# Patient Record
Sex: MALE | Race: WHITE | NOT HISPANIC OR LATINO | Employment: FULL TIME | URBAN - METROPOLITAN AREA
[De-identification: names, ages, dates, MRNs, and addresses within clinical notes are randomized per-mention and may not be internally consistent; named-entity substitution may affect disease eponyms.]

---

## 2018-08-14 ENCOUNTER — OFFICE VISIT (OUTPATIENT)
Dept: URGENT CARE | Facility: CLINIC | Age: 43
End: 2018-08-14
Payer: COMMERCIAL

## 2018-08-14 VITALS
HEART RATE: 74 BPM | RESPIRATION RATE: 18 BRPM | OXYGEN SATURATION: 98 % | TEMPERATURE: 97.6 F | HEIGHT: 66 IN | WEIGHT: 286 LBS | BODY MASS INDEX: 45.96 KG/M2

## 2018-08-14 DIAGNOSIS — B35.1 ONYCHOMYCOSIS: ICD-10-CM

## 2018-08-14 DIAGNOSIS — L25.5 RHUS DERMATITIS: Primary | ICD-10-CM

## 2018-08-14 PROCEDURE — S9088 SERVICES PROVIDED IN URGENT: HCPCS | Performed by: PHYSICIAN ASSISTANT

## 2018-08-14 PROCEDURE — 99213 OFFICE O/P EST LOW 20 MIN: CPT | Performed by: PHYSICIAN ASSISTANT

## 2018-08-14 RX ORDER — PREDNISONE 10 MG/1
TABLET ORAL
Qty: 18 TABLET | Refills: 0 | Status: SHIPPED | OUTPATIENT
Start: 2018-08-14 | End: 2021-08-24 | Stop reason: ALTCHOICE

## 2018-08-14 NOTE — PROGRESS NOTES
[id number]        NAME: Sierra Sosa is a 40 y o  male  : 1975    MRN: [id number]  DATE: 2018  TIME: 3:10 PM    Assessment and Plan   Rhus dermatitis [L23 7]  1  Rhus dermatitis  predniSONE 10 mg tablet   2  Onychomycosis           Patient Instructions     otc Dr Letty Epstein or similar nail fungus treatment  OTC hydrocortisone cream for rash  Prednisone taper  Follow up with PCP in 3-5 days  Proceed to  ER if symptoms worsen  Chief Complaint     Chief Complaint   Patient presents with    Rash     x3 days    Foot Problem     x1 week         History of Present Illness         36-year-old male complains of left forearm itching rash for 1 week  He also reports he has had some discoloration of his left great toenail for several weeks  He tried cleaning out his toenail with alcohol and lifting up the nail bed  That did not help  He has been using calamine lotion on the rash  No Benadryl over-the-counter itch medicine  No trouble swallowing or breathing  Review of Systems   Review of Systems      Current Medications       Current Outpatient Prescriptions:     predniSONE 10 mg tablet, 3 tabs daily for 3 days, 2 tabs daily for 3 days, 1 tab daily for 3 days, Disp: 18 tablet, Rfl: 0    Current Allergies     Allergies as of 2018    (Not on File)            The following portions of the patient's history were reviewed and updated as appropriate: allergies, current medications, past family history, past medical history, past social history, past surgical history and problem list      History reviewed  No pertinent past medical history  History reviewed  No pertinent surgical history  No family history on file  Medications have been verified          Objective   Pulse 74   Temp 97 6 °F (36 4 °C) (Tympanic)   Resp 18   Ht 5' 6" (1 676 m)   Wt 130 kg (286 lb)   SpO2 98%   BMI 46 16 kg/m²        Physical Exam     Physical Exam   Constitutional: He appears well-developed and well-nourished  HENT:   Mouth/Throat: Oropharynx is clear and moist    Cardiovascular: Normal rate and regular rhythm  Pulmonary/Chest: Effort normal and breath sounds normal    Skin:     Left great toenail with lateral and medial nail fold yellowing and brittle nail  No paronychia  Left volar forearm with grouped linear red base vesicles and red papules  These are nontender no drainage

## 2018-08-14 NOTE — PATIENT INSTRUCTIONS
otc Dr Sil España or similar nail fungus treatment  OTC hydrocortisone cream for rash  Prednisone taper  Follow up with PCP in 3-5 days  Proceed to  ER if symptoms worsen

## 2021-03-23 ENCOUNTER — OFFICE VISIT (OUTPATIENT)
Dept: OBGYN CLINIC | Facility: CLINIC | Age: 46
End: 2021-03-23
Payer: COMMERCIAL

## 2021-03-23 ENCOUNTER — APPOINTMENT (OUTPATIENT)
Dept: RADIOLOGY | Facility: CLINIC | Age: 46
End: 2021-03-23
Payer: COMMERCIAL

## 2021-03-23 VITALS
HEIGHT: 66 IN | DIASTOLIC BLOOD PRESSURE: 86 MMHG | RESPIRATION RATE: 20 BRPM | BODY MASS INDEX: 47.51 KG/M2 | HEART RATE: 73 BPM | WEIGHT: 295.6 LBS | SYSTOLIC BLOOD PRESSURE: 129 MMHG

## 2021-03-23 DIAGNOSIS — M17.11 PRIMARY OSTEOARTHRITIS OF RIGHT KNEE: ICD-10-CM

## 2021-03-23 DIAGNOSIS — G89.29 CHRONIC PAIN OF RIGHT KNEE: Primary | ICD-10-CM

## 2021-03-23 DIAGNOSIS — M25.561 CHRONIC PAIN OF RIGHT KNEE: Primary | ICD-10-CM

## 2021-03-23 DIAGNOSIS — S83.241A TEAR OF MEDIAL MENISCUS OF RIGHT KNEE, CURRENT, UNSPECIFIED TEAR TYPE, INITIAL ENCOUNTER: ICD-10-CM

## 2021-03-23 DIAGNOSIS — M25.561 RIGHT KNEE PAIN, UNSPECIFIED CHRONICITY: ICD-10-CM

## 2021-03-23 PROCEDURE — 73564 X-RAY EXAM KNEE 4 OR MORE: CPT

## 2021-03-23 PROCEDURE — 99203 OFFICE O/P NEW LOW 30 MIN: CPT | Performed by: ORTHOPAEDIC SURGERY

## 2021-03-23 PROCEDURE — 73560 X-RAY EXAM OF KNEE 1 OR 2: CPT
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
## 2021-03-30 ENCOUNTER — OFFICE VISIT (OUTPATIENT)
Dept: OBGYN CLINIC | Facility: CLINIC | Age: 46
End: 2021-03-30
Payer: COMMERCIAL

## 2021-03-30 VITALS
SYSTOLIC BLOOD PRESSURE: 119 MMHG | RESPIRATION RATE: 20 BRPM | DIASTOLIC BLOOD PRESSURE: 75 MMHG | WEIGHT: 294 LBS | BODY MASS INDEX: 47.25 KG/M2 | HEART RATE: 65 BPM | HEIGHT: 66 IN

## 2021-03-30 DIAGNOSIS — S83.241A TEAR OF MEDIAL MENISCUS OF RIGHT KNEE, CURRENT, UNSPECIFIED TEAR TYPE, INITIAL ENCOUNTER: Primary | ICD-10-CM

## 2021-03-30 DIAGNOSIS — M17.11 PRIMARY OSTEOARTHRITIS OF RIGHT KNEE: ICD-10-CM

## 2021-03-30 PROCEDURE — 20610 DRAIN/INJ JOINT/BURSA W/O US: CPT | Performed by: ORTHOPAEDIC SURGERY

## 2021-03-30 PROCEDURE — 99213 OFFICE O/P EST LOW 20 MIN: CPT | Performed by: ORTHOPAEDIC SURGERY

## 2021-03-30 RX ORDER — BUPIVACAINE HYDROCHLORIDE 2.5 MG/ML
2 INJECTION, SOLUTION INFILTRATION; PERINEURAL
Status: COMPLETED | OUTPATIENT
Start: 2021-03-30 | End: 2021-03-30

## 2021-03-30 RX ORDER — LIDOCAINE HYDROCHLORIDE 10 MG/ML
2 INJECTION, SOLUTION INFILTRATION; PERINEURAL
Status: COMPLETED | OUTPATIENT
Start: 2021-03-30 | End: 2021-03-30

## 2021-03-30 RX ORDER — METHYLPREDNISOLONE ACETATE 40 MG/ML
2 INJECTION, SUSPENSION INTRA-ARTICULAR; INTRALESIONAL; INTRAMUSCULAR; SOFT TISSUE
Status: COMPLETED | OUTPATIENT
Start: 2021-03-30 | End: 2021-03-30

## 2021-03-30 RX ADMIN — LIDOCAINE HYDROCHLORIDE 2 ML: 10 INJECTION, SOLUTION INFILTRATION; PERINEURAL at 09:54

## 2021-03-30 RX ADMIN — BUPIVACAINE HYDROCHLORIDE 2 ML: 2.5 INJECTION, SOLUTION INFILTRATION; PERINEURAL at 09:54

## 2021-03-30 RX ADMIN — METHYLPREDNISOLONE ACETATE 2 ML: 40 INJECTION, SUSPENSION INTRA-ARTICULAR; INTRALESIONAL; INTRAMUSCULAR; SOFT TISSUE at 09:54

## 2021-03-30 NOTE — PROGRESS NOTES
Patient Name:  Aly Guerrero  MRN:  [de-identified]    Assessment & Plan     1  Tear of medial meniscus of right knee, current, unspecified tear type, initial encounter    2  Primary osteoarthritis of right knee        Patient has improving right knee pain with medial degenerative changes as well as medial meniscus tear and lateral meniscus tear on MRI without mechanical symptoms  Multiple treatment options were discussed with the patient today  Due to his symptoms per dominantly seeming to be related to degenerative changes and no current mechanical symptoms I have offered a corticosteroid injection  We discussed risks and benefits  The patient understood and elected to proceed forward  He tolerated the procedure well  We also discussed benefits of weight loss  I will see him back in 6 weeks for repeat evaluation of his symptoms at that time  If symptoms worsen or he develops mechanical symptoms we will consider possible viscosupplementation versus knee arthroscopy  History of the Present Illness   Aly Guerrero is a 41 y o  male For follow-up of right knee pain  He denies any swelling  He denies any locking or other mechanical symptoms  Pain is worse with activity  He does report improvement in his symptoms over the course of the past week  He does describe a dull intermittent ache in the medial aspect of his knee  No other new complaints  Review of Systems     Review of Systems    Physical Exam     /75   Pulse 65   Resp 20   Ht 5' 6" (1 676 m)   Wt 133 kg (294 lb)   BMI 47 45 kg/m²     Range of motion from 0 to 120  Degrees without pain  There is  no crepitus with range of motion  There is  no effusion  There is mild tenderness over the medial joint line  There is 5/5 quadriceps strength and tone  The patient  can perform a straight leg raise  Anterior and posterior drawer tests are  negative  There is  no varus or valgus instability  Kirk's testing is  negative    The patient is neurovascular intact distally  Data Review     I have personally reviewed pertinent films in PACS, and my interpretation follows  no new imaging reviewed today  Social History     Tobacco Use    Smoking status: Never Smoker    Smokeless tobacco: Never Used   Substance Use Topics    Alcohol use: No    Drug use: No           Large joint arthrocentesis  Universal Protocol:  Consent: Verbal consent obtained  Risks and benefits: risks, benefits and alternatives were discussed  Consent given by: patient  Time out: Immediately prior to procedure a "time out" was called to verify the correct patient, procedure, equipment, support staff and site/side marked as required  Patient understanding: patient states understanding of the procedure being performed  Patient consent: the patient's understanding of the procedure matches consent given  Radiology Images displayed and confirmed   If images not available, report reviewed: imaging studies available    Supporting Documentation  Indications: pain   Procedure Details  Needle size: 22 G  Ultrasound guidance: no  Approach: anterolateral  Medications administered: 2 mL bupivacaine 0 25 %; 2 mL lidocaine 1 %; 2 mL methylPREDNISolone acetate 40 mg/mL    Patient tolerance: patient tolerated the procedure well with no immediate complications          Shedrick Meigs, DO

## 2021-05-18 ENCOUNTER — OFFICE VISIT (OUTPATIENT)
Dept: OBGYN CLINIC | Facility: CLINIC | Age: 46
End: 2021-05-18
Payer: COMMERCIAL

## 2021-05-18 VITALS
BODY MASS INDEX: 47.25 KG/M2 | HEIGHT: 66 IN | HEART RATE: 73 BPM | RESPIRATION RATE: 20 BRPM | DIASTOLIC BLOOD PRESSURE: 83 MMHG | SYSTOLIC BLOOD PRESSURE: 125 MMHG | WEIGHT: 294 LBS

## 2021-05-18 DIAGNOSIS — M17.11 PRIMARY OSTEOARTHRITIS OF RIGHT KNEE: ICD-10-CM

## 2021-05-18 DIAGNOSIS — S86.111A GASTROCNEMIUS STRAIN, RIGHT, INITIAL ENCOUNTER: ICD-10-CM

## 2021-05-18 DIAGNOSIS — S76.311A HAMSTRING STRAIN, RIGHT, INITIAL ENCOUNTER: ICD-10-CM

## 2021-05-18 DIAGNOSIS — S83.241A TEAR OF MEDIAL MENISCUS OF RIGHT KNEE, CURRENT, UNSPECIFIED TEAR TYPE, INITIAL ENCOUNTER: Primary | ICD-10-CM

## 2021-05-18 DIAGNOSIS — S76.211A STRAIN OF ADDUCTOR MAGNUS MUSCLE, RIGHT, INITIAL ENCOUNTER: ICD-10-CM

## 2021-05-18 PROCEDURE — 99213 OFFICE O/P EST LOW 20 MIN: CPT | Performed by: ORTHOPAEDIC SURGERY

## 2021-05-18 NOTE — PROGRESS NOTES
Patient Name:  Pj Ennis  MRN:  [de-identified]    Assessment & Plan     1  Tear of medial meniscus of right knee, current, unspecified tear type, initial encounter  -     Ambulatory referral to Physical Therapy; Future    2  Primary osteoarthritis of right knee    3  Gastrocnemius strain, right, initial encounter  -     Ambulatory referral to Physical Therapy; Future    4  Hamstring strain, right, initial encounter  -     Ambulatory referral to Physical Therapy; Future    5  Strain of adductor donny muscle, right, initial encounter  -     Ambulatory referral to Physical Therapy; Future         Patient overall doing well status post right knee corticosteroid injection on 03/30/2021 and reports 70% improvement in his symptoms  He does have muscle soreness about his posterior medial thigh and medial calf  Likely secondary to compensatory changes in his gait secondary to pain  He reports improvements in his gait when he makes a conscious effort to walk more normally  We once again discussed conservative and surgical intervention  Due to his improvement I have recommended continued conservative management  At this time we have further discussed formal physical therapy to work on knee range of motion, gait training, core strengthening, and  Abductor, hamstring, and gastroc stretching  I will see him back in 6-8 weeks for repeat evaluation  History of the Present Illness   Pj Ennis is a 41 y o  male  Status post right knee corticosteroid injection on 03/30/2021  The patient reports about 70% improvement in his knee pain since the injection  He does report muscle soreness in his medial thigh and calf region which is worse with ambulation over the past several weeks  He feels that is due to compensation while ambulating  He does note making an effort to walk more normally without a limp and when he does so this pain improved  He denies any numbness or tingling  No back pain    No locking or other mechanical symptoms of the knee  No knee swelling  No other new complaints  Review of Systems     Review of Systems    Physical Exam     /83   Pulse 73   Resp 20   Ht 5' 6" (1 676 m)   Wt 133 kg (294 lb)   BMI 47 45 kg/m²       Right Knee: Range of motion from  0 to  120°  There is  Minimal patellofemoral crepitus with range of motion  There is  no effusion  There is minimal tenderness over the  Medial joint line  There is tenderness present over the abductor, medial hamstring, and medial gastroc muscle bellies without sign of trauma, erythema, ecchymosis  There is   4+/5 quadriceps strength and  normal tone  The patient  can perform a straight leg raise  negative  patellar grind test  Anterior drawer tests is  negative  Posterior drawer test is  negative  Varus stress testing reveals  No instability or pain  Valgus stress testing reveals  No instability or pain  Kirk's testing is  negative  The patient is neurovascular intact distally  Data Review     I have personally reviewed pertinent films in PACS, and my interpretation follows  No new imaging reviewed today      Social History     Tobacco Use    Smoking status: Never Smoker    Smokeless tobacco: Never Used   Substance Use Topics    Alcohol use: No    Drug use: No           Procedures    Tal Jessica, DO

## 2021-06-29 ENCOUNTER — OFFICE VISIT (OUTPATIENT)
Dept: OBGYN CLINIC | Facility: CLINIC | Age: 46
End: 2021-06-29
Payer: COMMERCIAL

## 2021-06-29 VITALS
HEART RATE: 66 BPM | SYSTOLIC BLOOD PRESSURE: 128 MMHG | DIASTOLIC BLOOD PRESSURE: 87 MMHG | BODY MASS INDEX: 47.09 KG/M2 | HEIGHT: 66 IN | WEIGHT: 293 LBS

## 2021-06-29 DIAGNOSIS — M22.41 PATELLA, CHONDROMALACIA, RIGHT: Primary | ICD-10-CM

## 2021-06-29 DIAGNOSIS — M25.561 CHRONIC PAIN OF RIGHT KNEE: ICD-10-CM

## 2021-06-29 DIAGNOSIS — G89.29 CHRONIC PAIN OF RIGHT KNEE: ICD-10-CM

## 2021-06-29 DIAGNOSIS — S76.311A HAMSTRING STRAIN, RIGHT, INITIAL ENCOUNTER: ICD-10-CM

## 2021-06-29 PROCEDURE — 99213 OFFICE O/P EST LOW 20 MIN: CPT | Performed by: ORTHOPAEDIC SURGERY

## 2021-06-29 NOTE — PROGRESS NOTES
Patient Name:  Padmini Huynh  MRN:  [de-identified]    Assessment & Plan     1  Chronic pain of right knee    2  Hamstring strain, right, initial encounter    3  Patella, chondromalacia, right        43-year-old male with right knee osteoarthritis, medial meniscus tear, hamstring, abductor and gastrocnemius strain  Currently he is more symptomatic from patellar chondromalacia  He does no previous improvement in his symptoms since corticosteroid injection but his improvement has plateaued  Once again discussed formal physical therapy  He displays quadriceps, hip abductor weakness as well as hamstring  And quadriceps tightness on exam   I also gave him a home exercise program focusing on short arc quadriceps strengthening exercises  I also discussed benefits of weight loss with the patient  Patient is now agreeable to begin physical therapy  Phys a new prescription was sent today  I will see him back in 6-8 weeks for repeat evaluation  History of the Present Illness   Padmini Huynh is a 41 y o  male with right knee osteoarthritis, medial meniscus tear, hamstring, abductor and gastrocnemius strain  At last appointment, patient reported he noted about 70% improvement of right knee pain after corticosteroid injection  Physical therapy was prescribed for passive and active knee range of motion, gait training, core strengthening, abductor, hamstring, and gastrocnemius stretching  Today,   Patient states overall pain is unchanged since last visit  Currently he has more pain in the anterior aspect of his knee which is worse with prolonged walking, bending, or stair climbing  He denies any locking  He has not begun physical therapy  He denies any numbness or tingling  No other new complaints        Review of Systems     Review of Systems   Constitutional: Negative for chills and fever  HENT: Negative for ear pain and sore throat  Eyes: Negative for pain and visual disturbance     Respiratory: Negative for cough and shortness of breath  Cardiovascular: Negative for chest pain and palpitations  Gastrointestinal: Negative for abdominal pain and vomiting  Genitourinary: Negative for dysuria and hematuria  Musculoskeletal: Negative for arthralgias and back pain  Skin: Negative for color change and rash  Neurological: Negative for seizures and syncope  All other systems reviewed and are negative  Physical Exam     /87   Pulse 66   Ht 5' 6" (1 676 m)   Wt 133 kg (293 lb)   BMI 47 29 kg/m²     Right Knee: Range of motion from  0 to  120° without pain  There is  Mild patellofemoral crepitus with range of motion  There is  no effusion  There is  No tenderness over the  Medial or lateral joint lines  There is 4+/5 quadriceps strength  With poor control of contraction  The patient  can perform a straight leg raise  positive  patellar grind test  Anterior drawer tests is  negative  negative Lachman Test   Varus stress testing reveals  No instability or pain  Valgus stress testing reveals  No instability or pain  iKrk's testing is  negative  The patient is neurovascular intact distally  Data Review     I have personally reviewed pertinent films in PACS, and my interpretation follows  No new images needed at today's appointment      Social History     Tobacco Use    Smoking status: Never Smoker    Smokeless tobacco: Never Used   Vaping Use    Vaping Use: Never used   Substance Use Topics    Alcohol use: No    Drug use: No           Procedures       Tal Boone, DO

## 2021-08-18 ENCOUNTER — EVALUATION (OUTPATIENT)
Dept: PHYSICAL THERAPY | Facility: CLINIC | Age: 46
End: 2021-08-18
Payer: COMMERCIAL

## 2021-08-18 DIAGNOSIS — G89.29 CHRONIC PAIN OF RIGHT KNEE: ICD-10-CM

## 2021-08-18 DIAGNOSIS — M25.561 CHRONIC PAIN OF RIGHT KNEE: ICD-10-CM

## 2021-08-18 DIAGNOSIS — M22.41 PATELLA, CHONDROMALACIA, RIGHT: ICD-10-CM

## 2021-08-18 PROCEDURE — 97161 PT EVAL LOW COMPLEX 20 MIN: CPT | Performed by: PHYSICAL THERAPIST

## 2021-08-18 PROCEDURE — 97112 NEUROMUSCULAR REEDUCATION: CPT | Performed by: PHYSICAL THERAPIST

## 2021-08-18 PROCEDURE — 97110 THERAPEUTIC EXERCISES: CPT | Performed by: PHYSICAL THERAPIST
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
## 2021-08-20 ENCOUNTER — OFFICE VISIT (OUTPATIENT)
Dept: PHYSICAL THERAPY | Facility: CLINIC | Age: 46
End: 2021-08-20
Payer: COMMERCIAL

## 2021-08-20 DIAGNOSIS — G89.29 CHRONIC PAIN OF RIGHT KNEE: Primary | ICD-10-CM

## 2021-08-20 DIAGNOSIS — M22.41 PATELLA, CHONDROMALACIA, RIGHT: ICD-10-CM

## 2021-08-20 DIAGNOSIS — M25.561 CHRONIC PAIN OF RIGHT KNEE: Primary | ICD-10-CM

## 2021-08-20 PROCEDURE — 97110 THERAPEUTIC EXERCISES: CPT

## 2021-08-20 NOTE — PROGRESS NOTES
Daily Note     Today's date: 2021  Patient name: Curtis Gibson  : 1975  MRN: [id number]  Referring provider: Nicole Tirado DO  Dx:   Encounter Diagnosis     ICD-10-CM    1  Chronic pain of right knee  M25 561     G89 29    2  Patella, chondromalacia, right  M22 41                   Subjective: Patient reports "the tape gives a pulling sensation "      Objective: See treatment diary below      Assessment: Tolerated treatment well  Performed all exercises with no increased pain  VC to decrease speed of activity for muscular control  Patient would benefit from continued PT      Plan: Continue per plan of care        Precautions: none      Manuals 8-18 (IE)            Medial PFJ glides NV            Kinesiotape to correct laterally tilted patella (75% tension middle third, no tension on tails) JG MARIAA                                     Neuro Re-Ed                                                                                                        Ther Ex             Upright bike 8 mins, L2 NV 10 min L2                        Standing:             - hip 3 way with TB 2 x 10 ea RTB NV 2x10 ea RTB 2x10           - gastroc stretch on rockerboard 4 x 30" NV                         Seated:             - HS stretch 4 x 30" NV            - Matrix LAQ (90-30 deg) 2 x 10 NV                         Table:             - prone quad stretch with strap 4 x 30" NV 4x 30"           - SLR flexion 2 x 10 NV 2x10           - bridges with TB around knee 20 x 5" RTB NV 20x 5"                                     Ther Activity             Front step ups 2 x 10 6" step NV 2x10 6"           Lateral step up and overs 2 x 10 6" step NV 2x10 6"           TG squats 2 x 10 L22 NV                                      Gait Training                                       Modalities             Cryo/MH as needed

## 2021-08-24 ENCOUNTER — OFFICE VISIT (OUTPATIENT)
Dept: PHYSICAL THERAPY | Facility: CLINIC | Age: 46
End: 2021-08-24
Payer: COMMERCIAL

## 2021-08-24 ENCOUNTER — OFFICE VISIT (OUTPATIENT)
Dept: OBGYN CLINIC | Facility: CLINIC | Age: 46
End: 2021-08-24
Payer: COMMERCIAL

## 2021-08-24 VITALS
BODY MASS INDEX: 47.09 KG/M2 | DIASTOLIC BLOOD PRESSURE: 78 MMHG | SYSTOLIC BLOOD PRESSURE: 119 MMHG | HEIGHT: 66 IN | HEART RATE: 70 BPM | WEIGHT: 293 LBS | RESPIRATION RATE: 18 BRPM

## 2021-08-24 DIAGNOSIS — M22.41 PATELLA, CHONDROMALACIA, RIGHT: ICD-10-CM

## 2021-08-24 DIAGNOSIS — S76.311A HAMSTRING STRAIN, RIGHT, INITIAL ENCOUNTER: ICD-10-CM

## 2021-08-24 DIAGNOSIS — G89.29 CHRONIC PAIN OF RIGHT KNEE: Primary | ICD-10-CM

## 2021-08-24 DIAGNOSIS — M17.11 PRIMARY OSTEOARTHRITIS OF RIGHT KNEE: Primary | ICD-10-CM

## 2021-08-24 DIAGNOSIS — M25.561 CHRONIC PAIN OF RIGHT KNEE: Primary | ICD-10-CM

## 2021-08-24 PROCEDURE — 97110 THERAPEUTIC EXERCISES: CPT | Performed by: PHYSICAL THERAPIST

## 2021-08-24 PROCEDURE — 99213 OFFICE O/P EST LOW 20 MIN: CPT | Performed by: ORTHOPAEDIC SURGERY

## 2021-08-24 PROCEDURE — 97112 NEUROMUSCULAR REEDUCATION: CPT | Performed by: PHYSICAL THERAPIST

## 2021-08-24 NOTE — PROGRESS NOTES
Daily Note     Today's date: 2021  Patient name: Agustin Covarrubias  : 1975  MRN: [id number]  Referring provider: Paras Ballard DO  Dx:   Encounter Diagnosis     ICD-10-CM    1  Chronic pain of right knee  M25 561     G89 29    2  Patella, chondromalacia, right  M22 41                   Subjective: Pt reports no increase in pain following previous session  Objective: See treatment diary below      Assessment: Progressed exercises this session as charted to enhance functional strength and endurance  Pt was able to tolerate all exercises with minimal complaints of pain/discomfort  Tolerated treatment well  Patient demonstrated fatigue post treatment, exhibited good technique with therapeutic exercises and would benefit from continued PT      Plan: Progress treatment as tolerated         Precautions: none      Manuals  (IE) -          Medial PFJ glides NV  JG grade IV          Kinesiotape to correct laterally tilted patella (75% tension middle third, no tension on tails) JG MARIAA JG                                    Neuro Re-Ed                                                                                                        Ther Ex             Upright bike 8 mins, L2 NV 10 min L2 10 mins, L4                       Standing:             - hip 3 way with TB 2 x 10 ea RTB NV 2x10 ea RTB 2x10 2 x 10 ea RTB          - gastroc stretch on rockerboard 4 x 30" NV  4 x 30"                       Seated:             - HS stretch 4 x 30" NV            - Matrix LAQ (90-30 deg) 2 x 10 NV  2 x 10 30#                       Table:             - prone quad stretch with strap 4 x 30" NV 4x 30"           - SLR flexion 2 x 10 NV 2x10           - bridges with TB around knee 20 x 5" RTB NV 20x 5"                                     Ther Activity             Front step ups 2 x 10 6" step NV 2x10 6" 2x10 6"          Lateral step up and overs 2 x 10 6" step NV 2x10 6" 2x10 6"          TG squats 2 x 10 L22 NV  2 x 10 L22                                    Gait Training                                       Modalities             Cryo/MH as needed

## 2021-08-24 NOTE — PROGRESS NOTES
Patient Name:  Dakota Watkins  MRN:  [de-identified]    Assessment & Plan     1  Primary osteoarthritis of right knee    2  Hamstring strain, right, initial encounter    3  Patella, chondromalacia, right        41year old male with Right knee osteoarthritis, hamstring strain  Overall, patient doing well since corticosteroid injection and initiation of physical therapy  Advised patient to continue attendance of PT with adherence to home exercises  Continue taping of Right patella for comfort, administration of OTC oral analgesics as needed for pain relief, and ice application  Patient verbalized understanding of the above and will follow up in approximately 8 weeks for reevaluation  History of the Present Illness   Dakota Watkins is a 41 y o  male with Right knee osteoarthritis, medial meniscus tear  At last appointment, physical therapy was ordered for patient to start strengthening of Right lower extremity  Today, patient reports approximately 80% improvement since corticosteroid injection was administered on 03/30/2021 and attendance of physical therapy  He reports therapist has been using kinesio tape on his Right patella which has been helping  Still locates a dull pain to medial aspect of Right knee  He does not administer OTC oral analgesics as needed for pain relief  Review of Systems     Review of Systems   Constitutional: Negative for chills and fever  HENT: Negative for ear pain and sore throat  Eyes: Negative for pain and visual disturbance  Respiratory: Negative for cough and shortness of breath  Cardiovascular: Negative for chest pain and palpitations  Gastrointestinal: Negative for abdominal pain and vomiting  Genitourinary: Negative for dysuria and hematuria  Musculoskeletal: Negative for arthralgias, back pain and gait problem  Skin: Negative for color change and rash  Neurological: Negative for seizures and syncope     All other systems reviewed and are negative  Physical Exam     /78   Pulse 70   Resp 18   Ht 5' 6" (1 676 m)   Wt 133 kg (293 lb)   BMI 47 29 kg/m²     Right Knee: Range of motion from 0 to 120  There is no crepitus with range of motion  There is no effusion  There is tenderness over the medial joint line, minimal tenderness noted over hamstring tendons or pes and  There is 5/5 quadriceps strength and improving tone  The patient can perform a straight leg raise  Equivocal  patellar grind test  Varus stress testing reveals no pain or laxity  Valgus stress testing reveals no pain or laxity  Kirk's testing is negative  The patient is neurovascular intact distally  Data Review     I have personally reviewed pertinent films in PACS, and my interpretation follows  No new images provided at today's appointment  X-rays performed at a previous appointment demonstrates minimal to mild medial joint space narrowing without significant degenerative change, fracture, or dislocation       Social History     Tobacco Use    Smoking status: Never Smoker    Smokeless tobacco: Never Used   Vaping Use    Vaping Use: Never used   Substance Use Topics    Alcohol use: No    Drug use: No           Procedures  None    Mery West PA-C

## 2021-08-25 ENCOUNTER — OFFICE VISIT (OUTPATIENT)
Dept: PHYSICAL THERAPY | Facility: CLINIC | Age: 46
End: 2021-08-25
Payer: COMMERCIAL

## 2021-08-25 DIAGNOSIS — M22.41 PATELLA, CHONDROMALACIA, RIGHT: Primary | ICD-10-CM

## 2021-08-25 DIAGNOSIS — G89.29 CHRONIC PAIN OF RIGHT KNEE: ICD-10-CM

## 2021-08-25 DIAGNOSIS — M25.561 CHRONIC PAIN OF RIGHT KNEE: ICD-10-CM

## 2021-08-25 PROCEDURE — 97110 THERAPEUTIC EXERCISES: CPT | Performed by: PHYSICAL THERAPIST

## 2021-08-25 PROCEDURE — 97530 THERAPEUTIC ACTIVITIES: CPT | Performed by: PHYSICAL THERAPIST

## 2021-08-25 NOTE — PROGRESS NOTES
Daily Note     Today's date: 2021  Patient name: Lane Prajapati  : 1975  MRN: [id number]  Referring provider: Junaid Webster DO  Dx:   Encounter Diagnosis     ICD-10-CM    1  Patella, chondromalacia, right  M22 41    2  Chronic pain of right knee  M25 561     G89 29                   Subjective: Pt reports he feels he is limping a little more due to general fatigue in his right leg  Objective: See treatment diary below      Assessment: Tolerated treatment well  Patient demonstrated fatigue post treatment, exhibited good technique with therapeutic exercises and would benefit from continued PT  Pt continues to require minimal verbal cues to perform exercises with correct form and technique  Plan: Progress treatment as tolerated         Precautions: none      Manuals  (IE)          Medial PFJ glides NV  JG grade IV          Kinesiotape to correct laterally tilted patella (75% tension middle third, no tension on tails) JG MARIAA JG Not needed                                   Neuro Re-Ed                                                                                                        Ther Ex             Upright bike 8 mins, L2 NV 10 min L2 10 mins, L4 10 mins, L4                      Standing:             - hip 3 way with TB 2 x 10 ea RTB NV 2x10 ea RTB 2x10 2 x 10 ea RTB 2 x 10 ea RTB         - gastroc stretch on rockerboard 4 x 30" NV  4 x 30" 4 x 30"                      Seated:             - HS stretch 4 x 30" NV            - Matrix LAQ (90-30 deg) 2 x 10 NV  2 x 10 30# 2 x 10 30#                      Table:             - prone quad stretch with strap 4 x 30" NV 4x 30"  4 x 30"         - SLR flexion 2 x 10 NV 2x10  2  x10         - bridges with TB around knee 20 x 5" RTB NV 20x 5"  20 x 5" RTB                                   Ther Activity             Front step ups 2 x 10 6" step NV 2x10 6" 2x10 6" 2x10 6"         Lateral step up and overs 2 x 10 6" step NV 2x10 6" 2x10 6" 2x10 6"         TG squats 2 x 10 L22 NV  2 x 10 L22 2 x 10 L22                                   Gait Training                                       Modalities             Cryo/MH as needed

## 2021-08-31 ENCOUNTER — OFFICE VISIT (OUTPATIENT)
Dept: PHYSICAL THERAPY | Facility: CLINIC | Age: 46
End: 2021-08-31
Payer: COMMERCIAL

## 2021-08-31 DIAGNOSIS — M25.561 CHRONIC PAIN OF RIGHT KNEE: ICD-10-CM

## 2021-08-31 DIAGNOSIS — G89.29 CHRONIC PAIN OF RIGHT KNEE: ICD-10-CM

## 2021-08-31 DIAGNOSIS — M22.41 PATELLA, CHONDROMALACIA, RIGHT: Primary | ICD-10-CM

## 2021-08-31 PROCEDURE — 97530 THERAPEUTIC ACTIVITIES: CPT

## 2021-08-31 PROCEDURE — 97140 MANUAL THERAPY 1/> REGIONS: CPT

## 2021-08-31 PROCEDURE — 97110 THERAPEUTIC EXERCISES: CPT

## 2021-09-03 ENCOUNTER — OFFICE VISIT (OUTPATIENT)
Dept: PHYSICAL THERAPY | Facility: CLINIC | Age: 46
End: 2021-09-03
Payer: COMMERCIAL

## 2021-09-03 DIAGNOSIS — M22.41 PATELLA, CHONDROMALACIA, RIGHT: Primary | ICD-10-CM

## 2021-09-03 DIAGNOSIS — M25.561 CHRONIC PAIN OF RIGHT KNEE: ICD-10-CM

## 2021-09-03 DIAGNOSIS — G89.29 CHRONIC PAIN OF RIGHT KNEE: ICD-10-CM

## 2021-09-03 PROCEDURE — 97110 THERAPEUTIC EXERCISES: CPT

## 2021-09-03 NOTE — PROGRESS NOTES
Daily Note     Today's date: 9/3/2021  Patient name: Lakeshia Ortez  : 1975  MRN: [id number]  Referring provider: Hemalatha Webster DO  Dx:   Encounter Diagnosis     ICD-10-CM    1  Patella, chondromalacia, right  M22 41    2  Chronic pain of right knee  M25 561     G89 29                   Subjective: Patient reports "I am a little sore but I think it's the weather "      Objective: See treatment diary below      Assessment: Tolerated treatment well  No increased pain with exercises  KT tape able to provide improved patellar tracking and decrease discomfort  Patient demonstrated fatigue post treatment, exhibited good technique with therapeutic exercises and would benefit from continued PT      Plan: Progress treatment as tolerated         Precautions: none      Manuals  (IE) 8/20 8-24 8-25 8/31 9/3       Medial PFJ glides NV  JG grade IV          Kinesiotape to correct laterally tilted patella (75% tension middle third, no tension on tails) JG MARIAA JG Not needed SC MARIAA                                 Neuro Re-Ed                                                                                                        Ther Ex             Upright bike 8 mins, L2 NV 10 min L2 10 mins, L4 10 mins, L4 10 min L4  10 min L4                    Standing:             - hip 3 way with TB 2 x 10 ea RTB NV 2x10 ea RTB 2x10 2 x 10 ea RTB 2 x 10 ea RTB 2x 10  2x10  ea RTB       - gastroc stretch on rockerboard 4 x 30" NV  4 x 30" 4 x 30" NV 4x30"                    Seated:             - HS stretch 4 x 30" NV            - Matrix LAQ (90-30 deg) 2 x 10 NV  2 x 10 30# 2 x 10 30# 2x 10 30#  2x10 30#                     Table:             - prone quad stretch with strap 4 x 30" NV 4x 30"  4 x 30" 4x 30"  4x 30"       - SLR flexion 2 x 10 NV 2x10  2  x10 2x 10  2x10        - bridges with TB around knee 20 x 5" RTB NV 20x 5"  20 x 5" RTB NV 20x 5"                                 Ther Activity             Front step ups 2 x 10 6" step NV 2x10 6" 2x10 6" 2x10 6" 2x 10 8"  2x10 8"       Lateral step up and overs 2 x 10 6" step NV 2x10 6" 2x10 6" 2x10 6" 2x 10 8"  2x10 8"       TG squats 2 x 10 L22 NV  2 x 10 L22 2 x 10 L22 2x 10 L22  2x10 L22                                 Gait Training                                       Modalities             Cryo/MH as needed

## 2021-09-07 ENCOUNTER — OFFICE VISIT (OUTPATIENT)
Dept: PHYSICAL THERAPY | Facility: CLINIC | Age: 46
End: 2021-09-07
Payer: COMMERCIAL

## 2021-09-07 DIAGNOSIS — M22.41 PATELLA, CHONDROMALACIA, RIGHT: ICD-10-CM

## 2021-09-07 DIAGNOSIS — G89.29 CHRONIC PAIN OF RIGHT KNEE: Primary | ICD-10-CM

## 2021-09-07 DIAGNOSIS — M25.561 CHRONIC PAIN OF RIGHT KNEE: Primary | ICD-10-CM

## 2021-09-07 PROCEDURE — 97112 NEUROMUSCULAR REEDUCATION: CPT | Performed by: PHYSICAL THERAPIST

## 2021-09-07 PROCEDURE — 97110 THERAPEUTIC EXERCISES: CPT | Performed by: PHYSICAL THERAPIST

## 2021-09-07 PROCEDURE — 97530 THERAPEUTIC ACTIVITIES: CPT | Performed by: PHYSICAL THERAPIST

## 2021-09-07 NOTE — PROGRESS NOTES
Daily Note     Today's date: 2021  Patient name: Agapito Snellen  : 1975  MRN: [id number]  Referring provider: Krishan Thurston DO  Dx:   Encounter Diagnosis     ICD-10-CM    1  Chronic pain of right knee  M25 561     G89 29    2  Patella, chondromalacia, right  M22 41                   Subjective: Pt reports his knee is no worse than when he started PT, but not significantly better  Pt does state the tape reduces pain  Objective: See treatment diary below      Assessment: Educated pt's wife this date on how to apply kinesiotape at home if they would like as well as provided information on a lateral j knee stabilization brace  Progressed exercises this session as charted  Pt was able to tolerate all exercises with minimal reports of pain/discomfort  Tolerated treatment well  Patient demonstrated fatigue post treatment, exhibited good technique with therapeutic exercises and would benefit from continued PT      Plan: Progress treatment as tolerated         Precautions: none      Manuals - (IE) 8/20 8-24 8-25 8/31 9/3 9-7      Medial PFJ glides NV  JG grade IV          Kinesiotape to correct laterally tilted patella (75% tension middle third, no tension on tails) Hanane Robertson JG Not needed SC MARIAA JG                                Neuro Re-Ed                                                                                                        Ther Ex             Upright bike 8 mins, L2 NV 10 min L2 10 mins, L4 10 mins, L4 10 min L4  10 min L4 10 mins, L4                   Standing:             - hip 3 way with TB 2 x 10 ea RTB NV 2x10 ea RTB 2x10 2 x 10 ea RTB 2 x 10 ea RTB 2x 10  2x10  ea RTB       - gastroc stretch on rockerboard 4 x 30" NV  4 x 30" 4 x 30" NV 4x30"       - lateral band walks       2 mins RTB      - retroband walks       2 mins RTB                                Seated:             - HS stretch 4 x 30" NV            - Matrix LAQ (90-30 deg) 2 x 10 NV  2 x 10 30# 2 x 10 30# 2x 10 30# 2x10 30#  NV                   Table:             - prone quad stretch with strap 4 x 30" NV 4x 30"  4 x 30" 4x 30"  4x 30" Provide in HEP      - SLR flexion 2 x 10 NV 2x10  2  x10 2x 10  2x10  Provide in HEP      - bridges with TB around knee 20 x 5" RTB NV 20x 5"  20 x 5" RTB NV 20x 5"                                 Ther Activity             Front step ups 2 x 10 6" step NV 2x10 6" 2x10 6" 2x10 6" 2x 10 8"  2x10 8" 3 x 10 8" step      Lateral step up and overs 2 x 10 6" step NV 2x10 6" 2x10 6" 2x10 6" 2x 10 8"  2x10 8" 3 x 10 8" step      TG squats 2 x 10 L22 NV  2 x 10 L22 2 x 10 L22 2x 10 L22  2x10 L22 3 x 10 L22      TKE at gigi resisting valgus force       20 x 5" NV                   Gait Training                                       Modalities             Cryo/MH as needed

## 2021-09-09 ENCOUNTER — OFFICE VISIT (OUTPATIENT)
Dept: PHYSICAL THERAPY | Facility: CLINIC | Age: 46
End: 2021-09-09
Payer: COMMERCIAL

## 2021-09-09 DIAGNOSIS — M22.41 PATELLA, CHONDROMALACIA, RIGHT: ICD-10-CM

## 2021-09-09 DIAGNOSIS — G89.29 CHRONIC PAIN OF RIGHT KNEE: Primary | ICD-10-CM

## 2021-09-09 DIAGNOSIS — M25.561 CHRONIC PAIN OF RIGHT KNEE: Primary | ICD-10-CM

## 2021-09-09 PROCEDURE — 97110 THERAPEUTIC EXERCISES: CPT

## 2021-09-09 PROCEDURE — 97530 THERAPEUTIC ACTIVITIES: CPT

## 2021-09-09 NOTE — PROGRESS NOTES
Daily Note     Today's date: 2021  Patient name: Inda Sicard  : 1975  MRN: [id number]  Referring provider: Mackenzie Castillo DO  Dx:   Encounter Diagnosis     ICD-10-CM    1  Chronic pain of right knee  M25 561     G89 29    2  Patella, chondromalacia, right  M22 41                   Subjective: Pt reports his knee is feeling good overall and he feels the tape is helpful  He presents to therapy with KT tape still present on knee from lv  Objective: See treatment diary below      Assessment: Pt with good tolerance to addition of TKEs today  Fair ability to perform without valgus knee  Pt demonstrates glute fatigue with lateral band walking and retro walking  Tolerated remainder of treatment well with no complaints of increased pain or soreness  Patient demonstrated fatigue post treatment, exhibited good technique with therapeutic exercises and would benefit from continued PT      Plan: Progress treatment as tolerated         Precautions: none      Manuals 8-18 (IE) 8/20 8-24 8-25 8/31 9/3 9-     Medial PFJ glides NV  JG grade IV          Kinesiotape to correct laterally tilted patella (75% tension middle third, no tension on tails) JG MARIAA JG Not needed SC MARIAA JG SC                               Neuro Re-Ed                                                                                                        Ther Ex             Upright bike 8 mins, L2 NV 10 min L2 10 mins, L4 10 mins, L4 10 min L4  10 min L4 10 mins, L4 10 min  L4                  Standing:             - hip 3 way with TB 2 x 10 ea RTB NV 2x10 ea RTB 2x10 2 x 10 ea RTB 2 x 10 ea RTB 2x 10  2x10  ea RTB       - gastroc stretch on rockerboard 4 x 30" NV  4 x 30" 4 x 30" NV 4x30"       - lateral band walks       2 mins RTB 2 mins  RTB     - retroband walks       2 mins RTB 2 mins  RTB                               Seated:             - HS stretch 4 x 30" NV            - Matrix LAQ (90-30 deg) 2 x 10 NV  2 x 10 30# 2 x 10 30# 2x 10 30# 2x10 30#  NV 2x10   30#                  Table:             - prone quad stretch with strap 4 x 30" NV 4x 30"  4 x 30" 4x 30"  4x 30" Provide in HEP HEP     - SLR flexion 2 x 10 NV 2x10  2  x10 2x 10  2x10  Provide in HEP HEP     - bridges with TB around knee 20 x 5" RTB NV 20x 5"  20 x 5" RTB NV 20x 5"                                 Ther Activity             Front step ups 2 x 10 6" step NV 2x10 6" 2x10 6" 2x10 6" 2x 10 8"  2x10 8" 3 x 10 8" step 3x10  8" step     Lateral step up and overs 2 x 10 6" step NV 2x10 6" 2x10 6" 2x10 6" 2x 10 8"  2x10 8" 3 x 10 8" step 3x10 8" step     TG squats 2 x 10 L22 NV  2 x 10 L22 2 x 10 L22 2x 10 L22  2x10 L22 3 x 10 L22 3x10  L22     TKE at gigi resisting valgus force       20 x 5" NV 5#  20x5"                  Gait Training                                       Modalities             Cryo/MH as needed

## 2021-09-13 ENCOUNTER — OFFICE VISIT (OUTPATIENT)
Dept: PHYSICAL THERAPY | Facility: CLINIC | Age: 46
End: 2021-09-13
Payer: COMMERCIAL

## 2021-09-13 DIAGNOSIS — M22.41 PATELLA, CHONDROMALACIA, RIGHT: ICD-10-CM

## 2021-09-13 DIAGNOSIS — M25.561 CHRONIC PAIN OF RIGHT KNEE: Primary | ICD-10-CM

## 2021-09-13 DIAGNOSIS — G89.29 CHRONIC PAIN OF RIGHT KNEE: Primary | ICD-10-CM

## 2021-09-13 PROCEDURE — 97140 MANUAL THERAPY 1/> REGIONS: CPT

## 2021-09-13 PROCEDURE — 97110 THERAPEUTIC EXERCISES: CPT

## 2021-09-13 PROCEDURE — 97530 THERAPEUTIC ACTIVITIES: CPT

## 2021-09-13 NOTE — PROGRESS NOTES
Daily Note     Today's date: 2021  Patient name: James Guy  : 1975  MRN: [id number]  Referring provider: Yasemin Diaz DO  Dx:   Encounter Diagnosis     ICD-10-CM    1  Chronic pain of right knee  M25 561     G89 29    2  Patella, chondromalacia, right  M22 41                   Subjective: Pt noted that he feels like the pain is just the same as he started PT  Pt noted the tape application last visit really didn't feel like it helped  Pt noted that the pain not consistent  Objective: See treatment diary below      Assessment:  Continued with treatment session, trialed application of tape again this visit  VC/TC required for proper knee alignment with TKE to avoid compensation mechanics  Educated on importance of strengthening and continuation of Hep at home  Tolerated treatment fair  Patient exhibited good technique with therapeutic exercises and would benefit from continued PT  S/p treatment session patient noted no changed in pain status  Plan: Continue per plan of care        Precautions: none      Manuals  (IE) 8/20 8-24 8-25 8/31 9/3 9-    Medial PFJ glides NV  JG grade IV          Kinesiotape to correct laterally tilted patella (75% tension middle third, no tension on tails) JG MARIAA JG Not needed SC MARIAA JG SC SC                              Neuro Re-Ed                                                                                                        Ther Ex             Upright bike 8 mins, L2 NV 10 min L2 10 mins, L4 10 mins, L4 10 min L4  10 min L4 10 mins, L4 10 min  L4 10 min L4                  Standing:             - hip 3 way with TB 2 x 10 ea RTB NV 2x10 ea RTB 2x10 2 x 10 ea RTB 2 x 10 ea RTB 2x 10  2x10  ea RTB       - gastroc stretch on rockerboard 4 x 30" NV  4 x 30" 4 x 30" NV 4x30"       - lateral band walks       2 mins RTB 2 mins  RTB NV    - retroband walks       2 mins RTB 2 mins  RTB NV                              Seated:             - HS stretch 4 x 30" NV            - Matrix LAQ (90-30 deg) 2 x 10 NV  2 x 10 30# 2 x 10 30# 2x 10 30#  2x10 30#  NV 2x10   30# NV                 Table:             - prone quad stretch with strap 4 x 30" NV 4x 30"  4 x 30" 4x 30"  4x 30" Provide in HEP HEP     - SLR flexion 2 x 10 NV 2x10  2  x10 2x 10  2x10  Provide in HEP HEP 2x 10 VC     - bridges with TB around knee 20 x 5" RTB NV 20x 5"  20 x 5" RTB NV 20x 5"                                 Ther Activity             Front step ups 2 x 10 6" step NV 2x10 6" 2x10 6" 2x10 6" 2x 10 8"  2x10 8" 3 x 10 8" step 3x10  8" step 3x 10 8" step     Lateral step up and overs 2 x 10 6" step NV 2x10 6" 2x10 6" 2x10 6" 2x 10 8"  2x10 8" 3 x 10 8" step 3x10 8" step 3x 10 8" step     TG squats 2 x 10 L22 NV  2 x 10 L22 2 x 10 L22 2x 10 L22  2x10 L22 3 x 10 L22 3x10  L22 L22 3x 10     TKE at gigi resisting valgus force       20 x 5" NV 5#  20x5" 5# 20x 5"                  Gait Training                                       Modalities             Cryo/MH as needed

## 2021-09-16 ENCOUNTER — EVALUATION (OUTPATIENT)
Dept: PHYSICAL THERAPY | Facility: CLINIC | Age: 46
End: 2021-09-16
Payer: COMMERCIAL

## 2021-09-16 DIAGNOSIS — M25.561 CHRONIC PAIN OF RIGHT KNEE: Primary | ICD-10-CM

## 2021-09-16 DIAGNOSIS — M22.41 PATELLA, CHONDROMALACIA, RIGHT: ICD-10-CM

## 2021-09-16 DIAGNOSIS — G89.29 CHRONIC PAIN OF RIGHT KNEE: Primary | ICD-10-CM

## 2021-09-16 PROCEDURE — 97530 THERAPEUTIC ACTIVITIES: CPT

## 2021-09-16 PROCEDURE — 97110 THERAPEUTIC EXERCISES: CPT

## 2021-09-16 NOTE — PROGRESS NOTES
PT Evaluation     Today's date: 2021  Patient name: Otilia Potts  : 1975  MRN: [id number]  Referring provider: Gwen Blood DO  Dx:   Encounter Diagnosis     ICD-10-CM    1  Chronic pain of right knee  M25 561     G89 29    2  Patella, chondromalacia, right  M22 41                   Assessment  Assessment details: Otilia Potts is a pleasant 41 y o  presenting to physical therapy for a re-evaluation with a MD referral for Chronic pain of right knee and Patella, chondromalacia, right  Since his initial evaluation, he has reported an 80% improvement in his right knee  The patient has reported decreased pain with functional activities, improved right knee ROM, increased right knee strength,and improved right single leg balance since the start of his PT POC  However he continues to have increased pain with kneeling and walking as well as decreased quadriceps, hip abduction and hip external rotation strength functionally  This pt would benefit from continued skilled PT services to address their impairments and functional limitations to maximize functional outcome  Impairments: activity intolerance, impaired physical strength, lacks appropriate home exercise program and pain with function    Symptom irritability: moderateBarriers to therapy: chronicity of symptoms, BMI > 45, physical nature of job  Understanding of Dx/Px/POC: good   Prognosis: good    Goals  ST  Pt will improve knee flexion AROM to at least 130 degrees in 3 weeks  MET  2  Pt will be able to squat to 80 degrees knee flexion without heel raise or knees moving anterior to toes in 3 weeks  Progressing     LT  Pt will be able to stand > 30 minutes with minimal to no pain in 6 weeks  MET  2  Pt will be independent in a comprehensive HEP in 6 weeks   Progressing     Plan  Patient would benefit from: skilled physical therapy  Planned modality interventions: cryotherapy and thermotherapy: hydrocollator packs  Planned therapy interventions: balance/weight bearing training, manual therapy, joint mobilization, neuromuscular re-education, patient education, strengthening, stretching, therapeutic activities, therapeutic exercise, home exercise program, functional ROM exercises and flexibility  Frequency: 2x week  Duration in weeks: 6  Plan of Care beginning date: 2021  Plan of Care expiration date: 10/28/2021  Treatment plan discussed with: patient        Subjective Evaluation    History of Present Illness  Mechanism of injury: Pt reports 80% improvement since beginning PT services  He notes some improvement in muscle strength around his right knee which has helped to decrease some of his pain, however notes that he doesn't think he'll ever get back to 100% since he does have a meniscus tear and without surgery that won't be fixed  Patient notes that he performs a lot of kneeling during the day for work and notices that getting up from that position has been smoother and easier  Continues to have increased pain in his knee with pivoting motion that occurs when turning quickly or when shutting the car door  Notes that his pain is a dull ache sensation          Premorbid status:  - ADLs: Independent with no difficulty  - Work: Full time, Full duty-  and drain cleaning  - Recreation: none    Current status:  - ADLs/Functional activities:   - Stairs Reciprocal pattern with Pain Levels: mild pain   - Sit to stand with use of BUEs with mild pain    - Walking 45 minutes prior to increase in pain   - Standing 1 hour prior to increase in pain   - Driving with mild pain   - Sleeping with 0 nightly sleep disturbances due to pain   - Kneeling with knee pads mild pain  - Work: Full time, Full duty-  and drain cleaning  - Recreation: none  Pain  Current pain ratin  At best pain ratin  At worst pain ratin  Location: Medial aspect of knee  Quality: dull ache  Relieving factors: rest  Aggravating factors: stair climbing, walking and standing  Progression: improved      Diagnostic Tests  X-ray: abnormal  MRI studies: abnormal  Treatments  Previous treatment: injection treatment  Current treatment: physical therapy  Patient Goals  Patient goals for therapy: decreased pain, increased motion and increased strength          Objective     Active Range of Motion   Left Knee   Hyperextension  Flexion: 135 degrees   Extension: -5 degrees     Right Knee   Flexion: 130 degrees   Extension: -5 degrees     Passive Range of Motion     Right Knee   Flexion: 136 degrees     Mobility   Patellar Mobility:   Left Knee   WFL: medial, lateral, superior and inferior  Right Knee   WFL: lateral, superior and inferior  Hypomobile: medial     Strength/Myotome Testing     Left Hip   Planes of Motion   Flexion: 5  Abduction: 4+  External rotation: 5  Internal rotation: 5    Right Hip   Planes of Motion   Flexion: 5  Abduction: 4+  External rotation: 4+  Internal rotation: 5    Left Knee   Flexion: 5  Extension: 5    Right Knee   Flexion: 5  Extension: 5    Left Ankle/Foot   Dorsiflexion: 5  Plantar flexion: 5    Right Ankle/Foot   Dorsiflexion: 5  Plantar flexion: 5    Additional Strength Details  SLS L: 35 seconds  SLS R: 18 seconds    Squats: to 70 degrees knee flexion with 1 " heel raise on right    Flexibility:  - HS:moderate restriction B  - Gastroc: mild restriction on L, mod on R    Tests     Left Knee   Negative anterior Lachman, lateral Kirk, medial Kirk, patellar compression, patella-femoral grind, valgus stress test at 0 degrees, valgus stress test at 30 degrees, varus stress test at 0 degrees and varus stress test at 30 degrees  Right Knee   Positive lateral Kirk and medial Kirk  Negative anterior Lachman, patellar compression, patella-femoral grind, posterior drawer, posterior sag, valgus stress test at 0 degrees, valgus stress test at 30 degrees, varus stress test at 0 degrees and varus stress test at 30 degrees       Additional Tests Details  McConnel test (+) on R at 45 degrees knee flexion             Precautions: none    Manuals 8-18 (IE) 8/20 8-24 8-25 8/31 9/3 9-7 9/9 9/13 9/16   Medial PFJ glides NV  JG grade IV          Kinesiotape to correct laterally tilted patella (75% tension middle third, no tension on tails) JG MARIAA JG Not needed SC MARIAA JG SC SC BE                             Neuro Re-Ed                                                                                                        Ther Ex             Upright bike 8 mins, L2 NV 10 min L2 10 mins, L4 10 mins, L4 10 min L4  10 min L4 10 mins, L4 10 min  L4 10 min L4  L4 10 min                Standing:             - hip 3 way with TB 2 x 10 ea RTB NV 2x10 ea RTB 2x10 2 x 10 ea RTB 2 x 10 ea RTB 2x 10  2x10  ea RTB    2x10 ea  RTB    - gastroc stretch on rockerboard 4 x 30" NV  4 x 30" 4 x 30" NV 4x30"       - lateral band walks       2 mins RTB 2 mins  RTB NV 2 min  RTB   - retroband walks       2 mins RTB 2 mins  RTB NV 2 min   RTB                             Seated:             - HS stretch 4 x 30" NV            - Matrix LAQ (90-30 deg) 2 x 10 NV  2 x 10 30# 2 x 10 30# 2x 10 30#  2x10 30#  NV 2x10   30# NV NV                Table:             - prone quad stretch with strap 4 x 30" NV 4x 30"  4 x 30" 4x 30"  4x 30" Provide in HEP HEP     - SLR flexion 2 x 10 NV 2x10  2  x10 2x 10  2x10  Provide in HEP HEP 2x 10 VC  2x10  VC for quad contraction   - bridges with TB around knee 20 x 5" RTB NV 20x 5"  20 x 5" RTB NV 20x 5"    NV                             Ther Activity             Front step ups 2 x 10 6" step NV 2x10 6" 2x10 6" 2x10 6" 2x 10 8"  2x10 8" 3 x 10 8" step 3x10  8" step 3x 10 8" step  3x10  8" step   Lateral step up and overs 2 x 10 6" step NV 2x10 6" 2x10 6" 2x10 6" 2x 10 8"  2x10 8" 3 x 10 8" step 3x10 8" step 3x 10 8" step  3x10  8" step   TG squats 2 x 10 L22 NV  2 x 10 L22 2 x 10 L22 2x 10 L22  2x10 L22 3 x 10 L22 3x10  L22 L22 3x 10  3x10   L22   TKE at gigi resisting valgus force       20 x 5" NV 5#  20x5" 5# 20x 5"  5#   5"2x10  VC for form                Gait Training                                       Modalities             Cryo/MH as needed

## 2021-09-21 ENCOUNTER — OFFICE VISIT (OUTPATIENT)
Dept: PHYSICAL THERAPY | Facility: CLINIC | Age: 46
End: 2021-09-21
Payer: COMMERCIAL

## 2021-09-21 DIAGNOSIS — M22.41 PATELLA, CHONDROMALACIA, RIGHT: ICD-10-CM

## 2021-09-21 DIAGNOSIS — M25.561 CHRONIC PAIN OF RIGHT KNEE: Primary | ICD-10-CM

## 2021-09-21 DIAGNOSIS — G89.29 CHRONIC PAIN OF RIGHT KNEE: Primary | ICD-10-CM

## 2021-09-21 PROCEDURE — 97530 THERAPEUTIC ACTIVITIES: CPT

## 2021-09-21 PROCEDURE — 97110 THERAPEUTIC EXERCISES: CPT

## 2021-09-21 NOTE — PROGRESS NOTES
Daily Note     Today's date: 2021  Patient name: Walter Jimenez  : 1975  MRN: [id number]  Referring provider: Angelica Ortiz DO  Dx:   Encounter Diagnosis     ICD-10-CM    1  Chronic pain of right knee  M25 561     G89 29    2  Patella, chondromalacia, right  M22 41                   Subjective: offers no new complaints this session  States that he received the knee brace that he was advised to order  Objective: See treatment diary below      Assessment: Tolerated treatment well  Matrix LAQs reintroduced this session to facilitate quad strengthening  Patient demonstrates knee extension lag during SLR flexion, demonstrating quad fatigue  Patient was educated on how to properly don his lateral J patella stabilizer brace  Patient demonstrated and verbalized understanding  Patient demonstrated fatigue post treatment, exhibited good technique with therapeutic exercises and would benefit from continued PT  Plan: Continue per plan of care        Precautions: none    Manuals 9/21 8/20 8-24 8-25 8/31 9/3 9-   Medial PFJ glides   JG grade IV          Kinesiotape to correct laterally tilted patella (75% tension middle third, no tension on tails)  MARIAA JG Not needed SC MARIAA JG SC SC BE                             Neuro Re-Ed                                                                                                        Ther Ex             Upright bike L4 10 min 10 min L2 10 mins, L4 10 mins, L4 10 min L4  10 min L4 10 mins, L4 10 min  L4 10 min L4  L4 10 min                Standing:             - hip 3 way with TB GTB   2x10 ea  2x10 ea RTB 2x10 2 x 10 ea RTB 2 x 10 ea RTB 2x 10  2x10  ea RTB    2x10 ea  RTB    - gastroc stretch on rockerboard   4 x 30" 4 x 30" NV 4x30"       - lateral band walks 2 min  GTB      2 mins RTB 2 mins  RTB NV 2 min  RTB   - retroband walks 2 min  GTB      2 mins RTB 2 mins  RTB NV 2 min   RTB                             Seated:             - HS stretch - Matrix LAQ (90-30 deg) 30# 2x10  2 x 10 30# 2 x 10 30# 2x 10 30#  2x10 30#  NV 2x10   30# NV NV                Table:             - prone quad stretch with strap  4x 30"  4 x 30" 4x 30"  4x 30" Provide in HEP HEP     - SLR flexion 2x10  2x10  2  x10 2x 10  2x10  Provide in HEP HEP 2x 10 VC  2x10  VC for quad contraction   - bridges with TB around knee GTB  2x10 20x 5"  20 x 5" RTB NV 20x 5"    NV                             Ther Activity             Front step ups 3x10  8" step  2x10 6" 2x10 6" 2x10 6" 2x 10 8"  2x10 8" 3 x 10 8" step 3x10  8" step 3x 10 8" step  3x10  8" step   Lateral step up and overs 3x10  8" step 2x10 6" 2x10 6" 2x10 6" 2x 10 8"  2x10 8" 3 x 10 8" step 3x10 8" step 3x 10 8" step  3x10  8" step   TG squats L22  3x10  2 x 10 L22 2 x 10 L22 2x 10 L22  2x10 L22 3 x 10 L22 3x10  L22 L22 3x 10  3x10   L22   TKE at gigi resisting valgus force 5 5#  5"2x10      20 x 5" NV 5#  20x5" 5# 20x 5"  5#   5"2x10  VC for form                Gait Training                                       Modalities             Cryo/MH as needed

## 2021-09-27 ENCOUNTER — OFFICE VISIT (OUTPATIENT)
Dept: PHYSICAL THERAPY | Facility: CLINIC | Age: 46
End: 2021-09-27
Payer: COMMERCIAL

## 2021-09-27 DIAGNOSIS — M22.41 PATELLA, CHONDROMALACIA, RIGHT: ICD-10-CM

## 2021-09-27 DIAGNOSIS — G89.29 CHRONIC PAIN OF RIGHT KNEE: Primary | ICD-10-CM

## 2021-09-27 DIAGNOSIS — M25.561 CHRONIC PAIN OF RIGHT KNEE: Primary | ICD-10-CM

## 2021-09-27 PROCEDURE — 97530 THERAPEUTIC ACTIVITIES: CPT | Performed by: PHYSICAL THERAPIST

## 2021-09-27 PROCEDURE — 97110 THERAPEUTIC EXERCISES: CPT | Performed by: PHYSICAL THERAPIST

## 2021-09-27 NOTE — PROGRESS NOTES
Daily Note     Today's date: 2021  Patient name: Inda Sicard  : 1975  MRN: [id number]  Referring provider: Mackenzie Castillo DO  Dx:   Encounter Diagnosis     ICD-10-CM    1  Chronic pain of right knee  M25 561     G89 29    2  Patella, chondromalacia, right  M22 41                   Subjective: Patient states knee pain has been about the same  He reports having a loud "pop" in his knee this AM and then his knee felt better after that  Got his knee brace, but it doesn't work for him  Too tight to get on/off  Objective: See treatment diary below      Assessment: Tolerated treatment well  Patient would benefit from continued PT  Patient denies pain with TE today  Very fatigued with sidestepping with T-band  Plan: Continue per plan of care        Precautions: none    Manuals 9/21 9/27 8-24 8-25 8/31 9/3 9-   Medial PFJ glides             Kinesiotape to correct laterally tilted patella (75% tension middle third, no tension on tails)  JF  Not needed SC MARIAA JG SC SC BE                             Neuro Re-Ed                                                                                                        Ther Ex             Upright bike L4 10 min 10 min L4 10 mins, L4 10 mins, L4 10 min L4  10 min L4 10 mins, L4 10 min  L4 10 min L4  L4 10 min                Standing:             - hip 3 way with TB GTB   2x10 ea  GTB   2x10 ea  2 x 10 ea RTB 2 x 10 ea RTB 2x 10  2x10  ea RTB    2x10 ea  RTB    - gastroc stretch on rockerboard   4 x 30" 4 x 30" NV 4x30"       - lateral band walks 2 min  GTB 2 min  GTB     2 mins RTB 2 mins  RTB NV 2 min  RTB   - retroband walks 2 min  GTB 2 min  GTB     2 mins RTB 2 mins  RTB NV 2 min   RTB                             Seated:             - HS stretch             - Matrix LAQ (90-30 deg) 30# 2x10 30# 2x10 2 x 10 30# 2 x 10 30# 2x 10 30#  2x10 30#  NV 2x10   30# NV NV                Table:             - prone quad stretch with strap  4x 30" 4 x 30" 4x 30"  4x 30" Provide in HEP HEP     - SLR flexion 2x10  2x10  2  x10 2x 10  2x10  Provide in HEP HEP 2x 10 VC  2x10  VC for quad contraction   - bridges with TB around knee GTB  2x10 GTB   2x10   20 x 5" RTB NV 20x 5"    NV                             Ther Activity             Front step ups 3x10  8" step  3x10  8" 2x10 6" 2x10 6" 2x 10 8"  2x10 8" 3 x 10 8" step 3x10  8" step 3x 10 8" step  3x10  8" step   Lateral step up and overs 3x10  8" step 3x10  8" step 2x10 6" 2x10 6" 2x 10 8"  2x10 8" 3 x 10 8" step 3x10 8" step 3x 10 8" step  3x10  8" step   TG squats L22  3x10 L22  3x10 2 x 10 L22 2 x 10 L22 2x 10 L22  2x10 L22 3 x 10 L22 3x10  L22 L22 3x 10  3x10   L22   TKE at gigi resisting valgus force 5 5#  5"2x10 6 5#  5"2x10     20 x 5" NV 5#  20x5" 5# 20x 5"  5#   5"2x10  VC for form                Gait Training                                       Modalities             Cryo/MH as needed

## 2021-10-01 ENCOUNTER — OFFICE VISIT (OUTPATIENT)
Dept: PHYSICAL THERAPY | Facility: CLINIC | Age: 46
End: 2021-10-01
Payer: COMMERCIAL

## 2021-10-01 DIAGNOSIS — G89.29 CHRONIC PAIN OF RIGHT KNEE: Primary | ICD-10-CM

## 2021-10-01 DIAGNOSIS — M25.561 CHRONIC PAIN OF RIGHT KNEE: Primary | ICD-10-CM

## 2021-10-01 DIAGNOSIS — M22.41 PATELLA, CHONDROMALACIA, RIGHT: ICD-10-CM

## 2021-10-01 PROCEDURE — 97530 THERAPEUTIC ACTIVITIES: CPT

## 2021-10-01 PROCEDURE — 97110 THERAPEUTIC EXERCISES: CPT

## 2021-10-05 ENCOUNTER — OFFICE VISIT (OUTPATIENT)
Dept: PHYSICAL THERAPY | Facility: CLINIC | Age: 46
End: 2021-10-05
Payer: COMMERCIAL

## 2021-10-05 DIAGNOSIS — M22.41 PATELLA, CHONDROMALACIA, RIGHT: ICD-10-CM

## 2021-10-05 DIAGNOSIS — M25.561 CHRONIC PAIN OF RIGHT KNEE: Primary | ICD-10-CM

## 2021-10-05 DIAGNOSIS — G89.29 CHRONIC PAIN OF RIGHT KNEE: Primary | ICD-10-CM

## 2021-10-05 PROCEDURE — 97140 MANUAL THERAPY 1/> REGIONS: CPT

## 2021-10-05 PROCEDURE — 97110 THERAPEUTIC EXERCISES: CPT

## 2021-10-05 PROCEDURE — 97530 THERAPEUTIC ACTIVITIES: CPT

## 2021-10-08 ENCOUNTER — OFFICE VISIT (OUTPATIENT)
Dept: PHYSICAL THERAPY | Facility: CLINIC | Age: 46
End: 2021-10-08
Payer: COMMERCIAL

## 2021-10-08 DIAGNOSIS — M25.561 CHRONIC PAIN OF RIGHT KNEE: Primary | ICD-10-CM

## 2021-10-08 DIAGNOSIS — M22.41 PATELLA, CHONDROMALACIA, RIGHT: ICD-10-CM

## 2021-10-08 DIAGNOSIS — G89.29 CHRONIC PAIN OF RIGHT KNEE: Primary | ICD-10-CM

## 2021-10-08 PROCEDURE — 97140 MANUAL THERAPY 1/> REGIONS: CPT

## 2021-10-08 PROCEDURE — 97530 THERAPEUTIC ACTIVITIES: CPT

## 2021-10-08 PROCEDURE — 97110 THERAPEUTIC EXERCISES: CPT

## 2021-10-12 ENCOUNTER — OFFICE VISIT (OUTPATIENT)
Dept: PHYSICAL THERAPY | Facility: CLINIC | Age: 46
End: 2021-10-12
Payer: COMMERCIAL

## 2021-10-12 DIAGNOSIS — G89.29 CHRONIC PAIN OF RIGHT KNEE: Primary | ICD-10-CM

## 2021-10-12 DIAGNOSIS — M22.41 PATELLA, CHONDROMALACIA, RIGHT: ICD-10-CM

## 2021-10-12 DIAGNOSIS — M25.561 CHRONIC PAIN OF RIGHT KNEE: Primary | ICD-10-CM

## 2021-10-12 PROCEDURE — 97530 THERAPEUTIC ACTIVITIES: CPT

## 2021-10-12 PROCEDURE — 97110 THERAPEUTIC EXERCISES: CPT

## 2021-10-15 ENCOUNTER — EVALUATION (OUTPATIENT)
Dept: PHYSICAL THERAPY | Facility: CLINIC | Age: 46
End: 2021-10-15
Payer: COMMERCIAL

## 2021-10-15 DIAGNOSIS — M22.41 PATELLA, CHONDROMALACIA, RIGHT: ICD-10-CM

## 2021-10-15 DIAGNOSIS — M25.561 CHRONIC PAIN OF RIGHT KNEE: Primary | ICD-10-CM

## 2021-10-15 DIAGNOSIS — G89.29 CHRONIC PAIN OF RIGHT KNEE: Primary | ICD-10-CM

## 2021-10-15 PROCEDURE — 97110 THERAPEUTIC EXERCISES: CPT

## 2021-10-15 PROCEDURE — 97530 THERAPEUTIC ACTIVITIES: CPT

## 2021-10-19 ENCOUNTER — OFFICE VISIT (OUTPATIENT)
Dept: OBGYN CLINIC | Facility: CLINIC | Age: 46
End: 2021-10-19
Payer: COMMERCIAL

## 2021-10-19 DIAGNOSIS — S83.241A TEAR OF MEDIAL MENISCUS OF RIGHT KNEE, CURRENT, UNSPECIFIED TEAR TYPE, INITIAL ENCOUNTER: ICD-10-CM

## 2021-10-19 DIAGNOSIS — M17.11 PRIMARY OSTEOARTHRITIS OF RIGHT KNEE: Primary | ICD-10-CM

## 2021-10-19 PROCEDURE — 99213 OFFICE O/P EST LOW 20 MIN: CPT | Performed by: ORTHOPAEDIC SURGERY

## 2022-01-04 ENCOUNTER — OFFICE VISIT (OUTPATIENT)
Dept: OBGYN CLINIC | Facility: CLINIC | Age: 47
End: 2022-01-04
Payer: COMMERCIAL

## 2022-01-04 VITALS
WEIGHT: 295 LBS | DIASTOLIC BLOOD PRESSURE: 86 MMHG | BODY MASS INDEX: 47.41 KG/M2 | HEIGHT: 66 IN | HEART RATE: 85 BPM | SYSTOLIC BLOOD PRESSURE: 138 MMHG

## 2022-01-04 DIAGNOSIS — S83.241A TEAR OF MEDIAL MENISCUS OF RIGHT KNEE, CURRENT, UNSPECIFIED TEAR TYPE, INITIAL ENCOUNTER: ICD-10-CM

## 2022-01-04 DIAGNOSIS — M17.11 PRIMARY OSTEOARTHRITIS OF RIGHT KNEE: Primary | ICD-10-CM

## 2022-01-04 PROCEDURE — 99213 OFFICE O/P EST LOW 20 MIN: CPT | Performed by: ORTHOPAEDIC SURGERY

## 2022-01-04 NOTE — PROGRESS NOTES
Patient Name:  Angella Barnes  MRN:  [de-identified]    Assessment & Plan     1  Primary osteoarthritis of right knee    2  Tear of medial meniscus of right knee, current, unspecified tear type, initial encounter        40year old male with Right knee osteoarthritis and medial meniscus tear  X-rays again reviewed in office today with patient  At this time, patient notes improvement of pain since last visit and after popping sensation recently  Will hold off on corticosteroid injection at this time as he is not experiencing pain  Advised patient to conintue with quad, hamstring, and hip abductor strengthening; also briefly discussed weight loss with patient  In the future if symptoms return or worsen, can make appointment for corticosteroid vs viscosupplementation injection  Verbalized understanding of the above and will follow up in office as needed  History of the Present Illness   Angella Barnes is a 40 y o  male with Right knee osteoarthritis and medial meniscus tear  At last appointment, patient was advised to continue with strengthening and OTC as needed for pain and follow up as needed  Today, patient reports a recent "popping" sound and sensation in Right knee which alarmed him  He states afterwards, pain was decreased and did not note any instability or giving away of Right knee  He wants to know if corticosteroid injection appropriate at this time  Review of Systems     Review of Systems   Constitutional: Negative for chills and fever  HENT: Negative for ear pain and sore throat  Eyes: Negative for pain and visual disturbance  Respiratory: Negative for cough and shortness of breath  Cardiovascular: Negative for chest pain and palpitations  Gastrointestinal: Negative for abdominal pain and vomiting  Genitourinary: Negative for dysuria and hematuria  Musculoskeletal: Negative for arthralgias and back pain  Skin: Negative for color change and rash     Neurological: Negative for seizures and syncope  All other systems reviewed and are negative  Physical Exam     /86   Pulse 85   Ht 5' 6" (1 676 m)   Wt 134 kg (295 lb)   BMI 47 61 kg/m²     Right Knee  Range of motion from 0 to 120  There is minimal patellofemoral crepitus with range of motion  There is no effusion  There is minimal tenderness over the MCL, not tenderness over joint lines  There is 5/5 quadriceps strength and preserved tone  The patient is ableperform a straight leg raise  Varus stress testing reveals no pain or laxity at 0 and 30 degrees   Valgus stress testing reveals no pain or laxity at 0 and 30 degrees  The patient is neurovascular intact distally  Data Review     I have personally reviewed pertinent films in PACS, and my interpretation follows      No new images     Social History     Tobacco Use    Smoking status: Never Smoker    Smokeless tobacco: Never Used   Vaping Use    Vaping Use: Never used   Substance Use Topics    Alcohol use: No    Drug use: No           Procedures  None   Elina Maria PA-C

## 2022-01-12 ENCOUNTER — OFFICE VISIT (OUTPATIENT)
Dept: URGENT CARE | Facility: CLINIC | Age: 47
End: 2022-01-12
Payer: COMMERCIAL

## 2022-01-12 VITALS
RESPIRATION RATE: 16 BRPM | TEMPERATURE: 97.3 F | OXYGEN SATURATION: 96 % | SYSTOLIC BLOOD PRESSURE: 124 MMHG | BODY MASS INDEX: 47.61 KG/M2 | DIASTOLIC BLOOD PRESSURE: 80 MMHG | WEIGHT: 295 LBS | HEART RATE: 84 BPM

## 2022-01-12 DIAGNOSIS — J02.9 SORE THROAT: Primary | ICD-10-CM

## 2022-01-12 LAB — S PYO AG THROAT QL: NEGATIVE

## 2022-01-12 PROCEDURE — S9088 SERVICES PROVIDED IN URGENT: HCPCS | Performed by: PHYSICIAN ASSISTANT

## 2022-01-12 PROCEDURE — U0003 INFECTIOUS AGENT DETECTION BY NUCLEIC ACID (DNA OR RNA); SEVERE ACUTE RESPIRATORY SYNDROME CORONAVIRUS 2 (SARS-COV-2) (CORONAVIRUS DISEASE [COVID-19]), AMPLIFIED PROBE TECHNIQUE, MAKING USE OF HIGH THROUGHPUT TECHNOLOGIES AS DESCRIBED BY CMS-2020-01-R: HCPCS | Performed by: PHYSICIAN ASSISTANT

## 2022-01-12 PROCEDURE — 99213 OFFICE O/P EST LOW 20 MIN: CPT | Performed by: PHYSICIAN ASSISTANT

## 2022-01-12 PROCEDURE — 87070 CULTURE OTHR SPECIMN AEROBIC: CPT | Performed by: PHYSICIAN ASSISTANT

## 2022-01-12 PROCEDURE — 87880 STREP A ASSAY W/OPTIC: CPT | Performed by: PHYSICIAN ASSISTANT

## 2022-01-12 PROCEDURE — U0005 INFEC AGEN DETEC AMPLI PROBE: HCPCS | Performed by: PHYSICIAN ASSISTANT

## 2022-01-12 NOTE — PROGRESS NOTES
[facility]        NAME: Diana Willson is a 40 y o  male  : 1975    MRN: [id number]  DATE: 2022  TIME: 5:03 PM    Assessment and Plan   Sore throat [J02 9]  1  Sore throat  POCT rapid strepA    Throat culture    COVID Only -Office Collect         Patient Instructions     Patient Instructions   Hydration and rest   COVID testing  Throat culture  Use the [facility] MyChart to obtain lab results in 24-48 hours  Home isolation  Wear your mask and wash hands often  PCP follow up  Go to an emergency department if difficulty breathing occurs  Recommended supplements for potential COVID-19 is the following: Vitamin D3 2000 IU  daily ,  Vitamin C 1g  every 12 hours , Multivitamin Daily       COVID-19 Home Care Guidelines    Your healthcare provider and/or public health staff have evaluated you and have determined that you do not need to remain in the hospital at this time  At this time you can be isolated at home where you will be monitored by staff from your local or state health department  You should carefully follow the prevention and isolation steps below until a healthcare provider or local or state health department says that you can return to your normal activities  Stay home except to get medical care    People who are mildly ill with COVID-19 are able to isolate at home during their illness  You should restrict activities outside your home, except for getting medical care  Do not go to work, school, or public areas  Avoid using public transportation, ride-sharing, or taxis  Separate yourself from other people and animals in your home    People: As much as possible, you should stay in a specific room and away from other people in your home  Also, you should use a separate bathroom, if available  Animals: You should restrict contact with pets and other animals while you are sick with COVID-19, just like you would around other people   Although there have not been reports of pets or other animals becoming sick with COVID-19, it is still recommended that people sick with COVID-19 limit contact with animals until more information is known about the virus  When possible, have another member of your household care for your animals while you are sick  If you are sick with COVID-19, avoid contact with your pet, including petting, snuggling, being kissed or licked, and sharing food  If you must care for your pet or be around animals while you are sick, wash your hands before and after you interact with pets and wear a facemask  See COVID-19 and Animals for more information  Call ahead before visiting your doctor    If you have a medical appointment, call the healthcare provider and tell them that you have or may have COVID-19  This will help the healthcare providers office take steps to keep other people from getting infected or exposed  Wear a facemask    You should wear a facemask when you are around other people (e g , sharing a room or vehicle) or pets and before you enter a healthcare providers office  If you are not able to wear a facemask (for example, because it causes trouble breathing), then people who live with you should not stay in the same room with you, or they should wear a facemask if they enter your room  Cover your coughs and sneezes    Cover your mouth and nose with a tissue when you cough or sneeze  Throw used tissues in a lined trash can  Immediately wash your hands with soap and water for at least 20 seconds or, if soap and water are not available, clean your hands with an alcohol-based hand  that contains at least 60% alcohol  Clean your hands often    Wash your hands often with soap and water for at least 20 seconds, especially after blowing your nose, coughing, or sneezing; going to the bathroom; and before eating or preparing food   If soap and water are not readily available, use an alcohol-based hand  with at least 60% alcohol, covering all surfaces of your hands and rubbing them together until they feel dry  Soap and water are the best option if hands are visibly dirty  Avoid touching your eyes, nose, and mouth with unwashed hands  Avoid sharing personal household items    You should not share dishes, drinking glasses, cups, eating utensils, towels, or bedding with other people or pets in your home  After using these items, they should be washed thoroughly with soap and water  Clean all high-touch surfaces everyday    High touch surfaces include counters, tabletops, doorknobs, bathroom fixtures, toilets, phones, keyboards, tablets, and bedside tables  Also, clean any surfaces that may have blood, stool, or body fluids on them  Use a household cleaning spray or wipe, according to the label instructions  Labels contain instructions for safe and effective use of the cleaning product including precautions you should take when applying the product, such as wearing gloves and making sure you have good ventilation during use of the product  Monitor your symptoms    Seek prompt medical attention if your illness is worsening (e g , difficulty breathing)  Before seeking care, call your healthcare provider and tell them that you have, or are being evaluated for, COVID-19  Put on a facemask before you enter the facility  These steps will help the healthcare providers office to keep other people in the office or waiting room from getting infected or exposed  Ask your healthcare provider to call the local or [facility] health department  Persons who are placed under active monitoring or facilitated self-monitoring should follow instructions provided by their local health department or occupational health professionals, as appropriate  If you have a medical emergency and need to call 911, notify the dispatch personnel that you have, or are being evaluated for COVID-19   If possible, put on a facemask before emergency medical services arrive  Discontinuing home isolation    Patients with confirmed COVID-19 should remain under home isolation precautions until the following conditions are met:   - They have had no fever for at least 24 hours (that is one full day of no fever without the use medicine that reduces fevers)  AND  - other symptoms have improved (for example, when their cough or shortness of breath have improved)  AND  - If had mild or moderate illness, at least 10 days have passed since their symptoms first appeared or if severe illness (needed oxygen) or immunosuppressed, at least 20 days have passed since symptoms first appeared  Patients with confirmed COVID-19 should also notify close contacts (including their workplace) and ask that they self-quarantine  Currently, close contact is defined as being within 6 feet for 15 minutes or more from the period 24 hours starting 48 hours before symptom onset to the time at which the patient went into isolation  Close contacts of patients diagnosed with COVID-19 should be instructed by the patient to self-quarantine for 14 days from the last time of their last contact with the patient  Source: [employer]             **Portions of the record may have been created with voice recognition software  Occasional wrong word or "sound a like" substitutions may have occurred due to the inherent limitations of voice recognition software  Read the chart carefully and recognize, using context, where substitutions have occurred  **     Chief Complaint     Chief Complaint   Patient presents with    Neck Pain     right side of neck sore and swollen x 1 day  No issues swallowing  No congestion or fever  Covid vaccine x2 taken, no flu vaccine         History of Present Illness     Hyun Pepper axel a 40 y o  male presents to clinic today with complaints of  sore throat and neck pain for 1  day(s)   Denies fever, congestion, ear pain, rhinorrhea, loss of smell and taste, cough, shortness of breath, myalgias and headache  Patient has not tried any OTC medications    Patient denies any known sick contacts or recent travel    Patient has had COVID-19 vaccination  Review of Systems     Review of Systems   Constitutional: Negative for appetite change, chills and fever  HENT: Positive for postnasal drip and sore throat  Negative for congestion, ear discharge, ear pain, facial swelling, mouth sores, sinus pressure and sinus pain  Eyes: Negative for discharge and redness  Respiratory: Negative for cough and shortness of breath  Cardiovascular: Negative for chest pain  Gastrointestinal: Negative for diarrhea, nausea and vomiting  Musculoskeletal: Positive for neck pain  Negative for myalgias and neck stiffness  Skin: Negative for rash  Neurological: Negative for dizziness and headaches  Current Medications   No current outpatient medications on file  Current Allergies     Allergies as of 01/12/2022 - Reviewed 01/12/2022   Allergen Reaction Noted    Peanut (diagnostic) - food allergy Anaphylaxis 02/19/2016            The following portions of the patient's history were reviewed and updated as appropriate: allergies, current medications, past family history, past medical history, past social history, past surgical history and problem list      History reviewed  No pertinent past medical history  History reviewed  No pertinent surgical history  Family History   Problem Relation Age of Onset    Diabetes Mother          Medications have been verified  Objective     /80   Pulse 84   Temp (!) 97 3 °F (36 3 °C)   Resp 16   Wt 134 kg (295 lb)   SpO2 96%   BMI 47 61 kg/m²        Physical Exam     Physical Exam  Vitals and nursing note reviewed  Constitutional:       General: He is not in acute distress  Appearance: Normal appearance  He is not ill-appearing     HENT:      Head: Normocephalic and atraumatic  Right Ear: A middle ear effusion is present  Tympanic membrane is not erythematous  Left Ear: Tympanic membrane normal  Tympanic membrane is not erythematous  Nose: Nose normal       Mouth/Throat:      Mouth: Mucous membranes are moist       Pharynx: Oropharynx is clear  Cardiovascular:      Rate and Rhythm: Normal rate and regular rhythm  Pulses: Normal pulses  Heart sounds: Normal heart sounds  Pulmonary:      Effort: Pulmonary effort is normal       Breath sounds: Normal breath sounds  Lymphadenopathy:      Cervical: No cervical adenopathy  Skin:     General: Skin is warm and dry  Findings: No rash  Neurological:      Mental Status: He is alert

## 2022-01-12 NOTE — PATIENT INSTRUCTIONS
Hydration and rest   COVID testing  Throat culture  Use the [facility] MyChart to obtain lab results in 24-48 hours  Home isolation  Wear your mask and wash hands often  PCP follow up  Go to an emergency department if difficulty breathing occurs  Recommended supplements for potential COVID-19 is the following: Vitamin D3 2000 IU  daily ,  Vitamin C 1g  every 12 hours , Multivitamin Daily       COVID-19 Home Care Guidelines    Your healthcare provider and/or public health staff have evaluated you and have determined that you do not need to remain in the hospital at this time  At this time you can be isolated at home where you will be monitored by staff from your local or state health department  You should carefully follow the prevention and isolation steps below until a healthcare provider or local or state health department says that you can return to your normal activities  Stay home except to get medical care    People who are mildly ill with COVID-19 are able to isolate at home during their illness  You should restrict activities outside your home, except for getting medical care  Do not go to work, school, or public areas  Avoid using public transportation, ride-sharing, or taxis  Separate yourself from other people and animals in your home    People: As much as possible, you should stay in a specific room and away from other people in your home  Also, you should use a separate bathroom, if available  Animals: You should restrict contact with pets and other animals while you are sick with COVID-19, just like you would around other people  Although there have not been reports of pets or other animals becoming sick with COVID-19, it is still recommended that people sick with COVID-19 limit contact with animals until more information is known about the virus  When possible, have another member of your household care for your animals while you are sick   If you are sick with COVID-19, avoid contact with your pet, including petting, snuggling, being kissed or licked, and sharing food  If you must care for your pet or be around animals while you are sick, wash your hands before and after you interact with pets and wear a facemask  See COVID-19 and Animals for more information  Call ahead before visiting your doctor    If you have a medical appointment, call the healthcare provider and tell them that you have or may have COVID-19  This will help the healthcare providers office take steps to keep other people from getting infected or exposed  Wear a facemask    You should wear a facemask when you are around other people (e g , sharing a room or vehicle) or pets and before you enter a healthcare providers office  If you are not able to wear a facemask (for example, because it causes trouble breathing), then people who live with you should not stay in the same room with you, or they should wear a facemask if they enter your room  Cover your coughs and sneezes    Cover your mouth and nose with a tissue when you cough or sneeze  Throw used tissues in a lined trash can  Immediately wash your hands with soap and water for at least 20 seconds or, if soap and water are not available, clean your hands with an alcohol-based hand  that contains at least 60% alcohol  Clean your hands often    Wash your hands often with soap and water for at least 20 seconds, especially after blowing your nose, coughing, or sneezing; going to the bathroom; and before eating or preparing food  If soap and water are not readily available, use an alcohol-based hand  with at least 60% alcohol, covering all surfaces of your hands and rubbing them together until they feel dry  Soap and water are the best option if hands are visibly dirty  Avoid touching your eyes, nose, and mouth with unwashed hands      Avoid sharing personal household items    You should not share dishes, drinking glasses, cups, eating utensils, towels, or bedding with other people or pets in your home  After using these items, they should be washed thoroughly with soap and water  Clean all high-touch surfaces everyday    High touch surfaces include counters, tabletops, doorknobs, bathroom fixtures, toilets, phones, keyboards, tablets, and bedside tables  Also, clean any surfaces that may have blood, stool, or body fluids on them  Use a household cleaning spray or wipe, according to the label instructions  Labels contain instructions for safe and effective use of the cleaning product including precautions you should take when applying the product, such as wearing gloves and making sure you have good ventilation during use of the product  Monitor your symptoms    Seek prompt medical attention if your illness is worsening (e g , difficulty breathing)  Before seeking care, call your healthcare provider and tell them that you have, or are being evaluated for, COVID-19  Put on a facemask before you enter the facility  These steps will help the healthcare providers office to keep other people in the office or waiting room from getting infected or exposed  Ask your healthcare provider to call the local or [facility] health department  Persons who are placed under active monitoring or facilitated self-monitoring should follow instructions provided by their local health department or occupational health professionals, as appropriate  If you have a medical emergency and need to call 911, notify the dispatch personnel that you have, or are being evaluated for COVID-19  If possible, put on a facemask before emergency medical services arrive      Discontinuing home isolation    Patients with confirmed COVID-19 should remain under home isolation precautions until the following conditions are met:   - They have had no fever for at least 24 hours (that is one full day of no fever without the use medicine that reduces fevers)  AND  - other symptoms have improved (for example, when their cough or shortness of breath have improved)  AND  - If had mild or moderate illness, at least 10 days have passed since their symptoms first appeared or if severe illness (needed oxygen) or immunosuppressed, at least 20 days have passed since symptoms first appeared  Patients with confirmed COVID-19 should also notify close contacts (including their workplace) and ask that they self-quarantine  Currently, close contact is defined as being within 6 feet for 15 minutes or more from the period 24 hours starting 48 hours before symptom onset to the time at which the patient went into isolation  Close contacts of patients diagnosed with COVID-19 should be instructed by the patient to self-quarantine for 14 days from the last time of their last contact with the patient       Source: [employer]

## 2022-01-14 LAB — SARS-COV-2 RNA RESP QL NAA+PROBE: POSITIVE

## 2022-01-15 LAB — BACTERIA THROAT CULT: NORMAL

## 2022-02-20 ENCOUNTER — HOSPITAL ENCOUNTER (EMERGENCY)
Facility: HOSPITAL | Age: 47
Discharge: HOME | End: 2022-02-20
Attending: EMERGENCY MEDICINE
Payer: COMMERCIAL

## 2022-02-20 ENCOUNTER — APPOINTMENT (EMERGENCY)
Dept: ULTRASOUND IMAGING | Facility: HOSPITAL | Age: 47
End: 2022-02-20
Payer: COMMERCIAL

## 2022-02-20 VITALS
TEMPERATURE: 97.9 F | OXYGEN SATURATION: 93 % | HEART RATE: 66 BPM | DIASTOLIC BLOOD PRESSURE: 60 MMHG | RESPIRATION RATE: 20 BRPM | SYSTOLIC BLOOD PRESSURE: 140 MMHG

## 2022-02-20 DIAGNOSIS — N50.812 TESTICULAR PAIN, LEFT: Primary | ICD-10-CM

## 2022-02-20 LAB
BILIRUB UR QL STRIP: NEGATIVE
CLARITY UR: CLEAR
COLOR UR: YELLOW
GLUCOSE SERPL-MCNC: 94 MG/DL (ref 65–140)
GLUCOSE UR STRIP-MCNC: NEGATIVE MG/DL
HGB UR QL STRIP.AUTO: NEGATIVE
KETONES UR STRIP-MCNC: NEGATIVE MG/DL
LEUKOCYTE ESTERASE UR QL STRIP: NEGATIVE
NITRITE UR QL STRIP: NEGATIVE
PH UR STRIP.AUTO: 6.5 [PH] (ref 4.5–8)
PROT UR STRIP-MCNC: NEGATIVE MG/DL
SP GR UR STRIP.AUTO: 1.02 (ref 1–1.03)
UROBILINOGEN UR QL STRIP.AUTO: 0.2 E.U./DL

## 2022-02-20 PROCEDURE — 99284 EMERGENCY DEPT VISIT MOD MDM: CPT | Performed by: EMERGENCY MEDICINE

## 2022-02-20 PROCEDURE — 82948 REAGENT STRIP/BLOOD GLUCOSE: CPT

## 2022-02-20 PROCEDURE — 76870 US EXAM SCROTUM: CPT

## 2022-02-20 PROCEDURE — 81003 URINALYSIS AUTO W/O SCOPE: CPT

## 2022-02-20 PROCEDURE — 99284 EMERGENCY DEPT VISIT MOD MDM: CPT

## 2022-02-20 RX ORDER — IBUPROFEN 600 MG/1
600 TABLET ORAL ONCE
Status: COMPLETED | OUTPATIENT
Start: 2022-02-20 | End: 2022-02-20

## 2022-02-20 RX ADMIN — IBUPROFEN 600 MG: 600 TABLET ORAL at 15:40

## 2022-02-20 NOTE — DISCHARGE INSTRUCTIONS
Patient has been contact information for Urology instructed to call them to set appointment as soon as possible  He has also been instructed to take ibuprofen as needed every 6 hours with small meals to avoid upset stomach  He has been instructed return to the emergency department should he develop numbness, tingling, hematuria, dysuria, fever or any other symptoms that he finds concerning

## 2022-02-20 NOTE — ED ATTENDING ATTESTATION
2/20/2022  Nirav BILLY DO, saw and evaluated the patient  I have discussed the patient with the resident/non-physician practitioner and agree with the resident's/non-physician practitioner's findings, Plan of Care, and MDM as documented in the resident's/non-physician practitioner's note, except where noted  All available labs and Radiology studies were reviewed  I was present for key portions of any procedure(s) performed by the resident/non-physician practitioner and I was immediately available to provide assistance  At this point I agree with the current assessment done in the Emergency Department  I have conducted an independent evaluation of this patient a history and physical is as follows:    Patient is a 52-year-old male with no past medical history who presents with sexual dysfunction  He states that he was having sexual intercourse with his wife and it took longer than normal for ejaculation  He also reports decreased amount of semen  He noted this on Friday, 02/18/2022  The following day, he was unable to ejaculate despite sexual arousal   He also notes mild pain in his left testicle  He denies fever, chills, nausea, vomiting, abdominal pain  On exam, patient is in no acute distress  Heart is regular rate and rhythm  Breath sounds normal   Abdomen is soft, nontender, nondistended  Penis circumcised  No scrotal swelling, erythema  Testicles nontender  Normal cremasteric reflex  No tenderness of epididymus  No inguinal lymphadenopathy  No urethral discharge  Ultrasound shows varicocele and epididymal cyst   I suspect that these are incidental findings  Urinalysis reveals no evidence of infection  Will refer to Urology for outpatient follow-up  Patient is well-appearing and stable for discharge  Portions of the above record have been created with voice recognition software    Occasional wrong word or "sound alike" substitutions may have occurred due to the inherent limitations of voice recognition software  Read the chart carefully and recognize, using context, where substitutions may have occurred        ED Course         Critical Care Time  Procedures

## 2022-02-20 NOTE — ED PROVIDER NOTES
History  Chief Complaint   Patient presents with    Testicle Pain     Pt arrives c/o L testicle pain since Friday night, reports he has had difficulty ejactulating since Wed  Denies blood in urine or in last ejactulation which was Wed despite his "wife's best efforts"  Denies fever or abd pain  Denies injury  Reports L testicle pain radiating to L groin     Patient is a 67-year-old male with no significant past medical history presents emergency department with complaint of left-sided testicle pain  The patient reports that he and his wife are having intercourse on 02/16 when he noticed that the volume of his ejaculation was much less than normal   Then again on 02/18 while having intercourse he noticed that he was unable to ejaculate he says despite my wife's best efforts  He then noticed on the morning of 2/19 that he was having left testicular pain and he described as dull and achy and nonradiating  He reports that he was unable to ejaculate on the morning of 2/19 as well  He denies taking any medication regularly and denies blood in his ejaculate, dysuria, hematuria or purulent drainage  He also denies fever but does mention that during their intercourse on 02/16 there was anal penetration and he wonders if that could have caused an infection  None       History reviewed  No pertinent past medical history  History reviewed  No pertinent surgical history  Family History   Problem Relation Age of Onset    Diabetes Mother      I have reviewed and agree with the history as documented      E-Cigarette/Vaping    E-Cigarette Use Never User      E-Cigarette/Vaping Substances    Nicotine No     THC No     CBD No     Flavoring No     Other No     Unknown No      Social History     Tobacco Use    Smoking status: Never Smoker    Smokeless tobacco: Never Used   Vaping Use    Vaping Use: Never used   Substance Use Topics    Alcohol use: No    Drug use: No        Review of Systems Constitutional: Negative for chills, fatigue and fever  HENT: Negative  Eyes: Negative for pain and visual disturbance  Respiratory: Negative for cough and shortness of breath  Cardiovascular: Negative for chest pain and palpitations  Gastrointestinal: Negative for abdominal distention, abdominal pain, constipation, diarrhea, nausea and vomiting  Endocrine: Negative  Genitourinary: Positive for testicular pain  Negative for dysuria and hematuria  Patient reports difficulty ejaculating  Musculoskeletal: Negative for arthralgias and back pain  Skin: Negative for color change and rash  Allergic/Immunologic: Negative  Neurological: Negative for seizures, syncope, weakness, light-headedness, numbness and headaches  Hematological: Negative  Psychiatric/Behavioral: Negative  All other systems reviewed and are negative  Physical Exam  ED Triage Vitals   Temperature Pulse Respirations Blood Pressure SpO2   02/20/22 1545 02/20/22 1514 02/20/22 1514 02/20/22 1514 02/20/22 1514   97 9 °F (36 6 °C) 81 16 (!) 178/109 97 %      Temp Source Heart Rate Source Patient Position - Orthostatic VS BP Location FiO2 (%)   02/20/22 1545 02/20/22 1514 -- -- --   Oral Monitor         Pain Score       02/20/22 1514       5             Orthostatic Vital Signs  Vitals:    02/20/22 1514 02/20/22 1530   BP: (!) 178/109 140/60   Pulse: 81 66       Physical Exam  Vitals and nursing note reviewed  Constitutional:       General: He is not in acute distress  Appearance: Normal appearance  He is obese  He is not ill-appearing  HENT:      Head: Normocephalic and atraumatic  Nose: Nose normal       Mouth/Throat:      Mouth: Mucous membranes are moist       Pharynx: Oropharynx is clear  Eyes:      Extraocular Movements: Extraocular movements intact  Conjunctiva/sclera: Conjunctivae normal       Pupils: Pupils are equal, round, and reactive to light     Cardiovascular:      Rate and Rhythm: Normal rate and regular rhythm  Pulses: Normal pulses  Heart sounds: Normal heart sounds  Pulmonary:      Effort: Pulmonary effort is normal       Breath sounds: Normal breath sounds  Abdominal:      General: Abdomen is flat  Bowel sounds are normal  There is no distension  Palpations: Abdomen is soft  There is no mass  Tenderness: There is no abdominal tenderness  There is no right CVA tenderness, left CVA tenderness, guarding or rebound  Genitourinary:     Penis: Normal        Testes: Normal    Musculoskeletal:         General: No swelling or tenderness  Normal range of motion  Cervical back: Normal range of motion and neck supple  No rigidity or tenderness  Right lower leg: No edema  Left lower leg: No edema  Skin:     General: Skin is dry  Capillary Refill: Capillary refill takes less than 2 seconds  Coloration: Skin is not jaundiced  Findings: No erythema or rash  Neurological:      General: No focal deficit present  Mental Status: He is alert and oriented to person, place, and time  Mental status is at baseline  Cranial Nerves: No cranial nerve deficit  Sensory: No sensory deficit  Motor: No weakness           ED Medications  Medications   ibuprofen (MOTRIN) tablet 600 mg (600 mg Oral Given 2/20/22 1540)       Diagnostic Studies  Results Reviewed     Procedure Component Value Units Date/Time    Fingerstick Glucose (POCT) [[id number]]  (Normal) Collected: 02/20/22 1544    Lab Status: Final result Updated: 02/20/22 1545     POC Glucose 94 mg/dl     Urine Macroscopic, POC [[id number]] Collected: 02/20/22 1523    Lab Status: Final result Specimen: Urine Updated: 02/20/22 1524     Color, UA Yellow     Clarity, UA Clear     pH, UA 6 5     Leukocytes, UA Negative     Nitrite, UA Negative     Protein, UA Negative mg/dl      Glucose, UA Negative mg/dl      Ketones, UA Negative mg/dl      Urobilinogen, UA 0 2 E U /dl      Bilirubin, UA Negative Blood, UA Negative     Specific Gravity, UA 1 025    Narrative:      CLINITEK RESULT                 US scrotum and testicles   Final Result by Luis Alberto Richards MD (02/20 1621)       1  No evidence of testicular torsion  2   Small left epididymal cyst    3   Left varicocele  Workstation performed: [id number]               Procedures  Procedures      ED Course  ED Course as of 02/20/22 1639   Sun Feb 20, 2022   1546 Urine dip and fingerstick glucose are both unremarkable  1638 Scrotal ultrasound was unremarkable  It indicated epididymal cyst and small varicocele of the left testicle  Patient has been informed and given contact information for urology  He will be discharged for follow-up on outpatient basis  [facility]  Number of Diagnoses or Management Options  Diagnosis management comments: Patient is a 43-year-old male with no significant past medical history presents emergency department with complaint of left-sided testicle pain  Upon physical exam his scrotal exam was benign  I have ordered an ultrasound of the scrotum at this time as well as Motrin for his pain and a fingerstick glucose check  I will reassess post treatment, imaging are pending at this time  Disposition  Final diagnoses:   Testicular pain, left     Time reflects when diagnosis was documented in both MDM as applicable and the Disposition within this note     Time User Action Codes Description Comment    2/20/2022  4:36 PM Evorn Going Add [C04 793] Testicular pain, left       ED Disposition     ED Disposition Condition Date/Time Comment    Discharge Stable Sun Feb 20, 2022  4:36 PM Marquis Hemphill discharge to home/self care  Follow-up Information     Follow up With Specialties Details Why Contact Info Additional [address] [facility] Schedule an appointment as soon as possible for a visit  left testicular pain   Dexter Crowley [address] [phone]  [facility], [address] [place] [address], [address] [facility] 107 Emergency Department Emergency Medicine  As needed [address] [facility] [address] [address] Emergency Department, [address], [place], [place], [address]          Patient's Medications    No medications on file     No discharge procedures on file  PDMP Review     None           ED Provider  Attending physically available and evaluated Taisha Richards I managed the patient along with the ED Attending      Electronically Signed by         Che Oropeza DO  02/20/22 4352

## 2022-02-25 NOTE — PROGRESS NOTES
2/28/2022      Chief Complaint   Patient presents with    Left testicular pain     Assessment and Plan    40 y o  male new patient     1  Left testicular pain, resolved   - US findings from 2/20/22 overall unremarkable showing small left varicocele and small left epididymal cyst  - Physical exam unremarkable  - Can f/u PRN    History of Present Illness  Eliane Liu is a 40 y o  male here for new patient evaluation of left testicular pain  He was seen in the ED on 02/20/2022 with this complaint  He reports this began after being unable to ejaculate during intercourse and "rough" intercourse  He reports at the time he was taking a lot of Mucinex for sinus symptoms  Ultrasound completed was negative for any acute findings but did show small left epididymal cyst and left varicocele  Urine testing completed was negative for blood or evidence of infection  He reports pain and symptoms have resolved  He denies any urinary complaints  Urine dip negative for blood, leukocytes, or nitrites  Review of Systems   Constitutional: Negative for chills and fever  Respiratory: Negative for shortness of breath  Cardiovascular: Negative for chest pain  Gastrointestinal: Negative for abdominal pain  Genitourinary: Negative for difficulty urinating, dysuria, flank pain, frequency, hematuria, penile pain and urgency  Neurological: Negative for dizziness  Past Medical History  History reviewed  No pertinent past medical history  Past Social History  History reviewed  No pertinent surgical history    Social History     Tobacco Use   Smoking Status Never Smoker   Smokeless Tobacco Never Used       Past Family History  Family History   Problem Relation Age of Onset    Diabetes Mother        Past Social history  Social History     Socioeconomic History    Marital status: /Civil Union     Spouse name: Not on file    Number of children: Not on file    Years of education: Not on file   [facility] Highest education level: Not on file   Occupational History    Not on file   Tobacco Use    Smoking status: Never Smoker    Smokeless tobacco: Never Used   Vaping Use    Vaping Use: Never used   Substance and Sexual Activity    Alcohol use: No    Drug use: No    Sexual activity: Not on file   Other Topics Concern    Not on file   Social History Narrative    Not on file     Social Determinants of Health     Financial Resource Strain: Not on file   Food Insecurity: Not on file   Transportation Needs: Not on file   Physical Activity: Not on file   Stress: Not on file   Social Connections: Not on file   Intimate Partner Violence: Not on file   Housing Stability: Not on file       Current Medications  No current outpatient medications on file  No current facility-administered medications for this visit  Allergies  Allergies   Allergen Reactions    Peanut (Diagnostic) - Food Allergy Anaphylaxis         The following portions of the patient's history were reviewed and updated as appropriate: allergies, current medications, past medical history, past social history, past surgical history and problem list       Vitals  Vitals:    02/28/22 0834   BP: 114/88   Pulse: 56   SpO2: 98%   Weight: (!) 138 kg (305 lb)   Height: 5' 6" (1 676 m)           Physical Exam  Physical Exam  Constitutional:       Appearance: Normal appearance  He is obese  HENT:      Head: Normocephalic and atraumatic  Right Ear: External ear normal       Left Ear: External ear normal    Eyes:      General: No scleral icterus  Conjunctiva/sclera: Conjunctivae normal    Cardiovascular:      Pulses: Normal pulses  Pulmonary:      Effort: Pulmonary effort is normal    Genitourinary:     Comments: Buried penis  Testicles descended and symmetric without palpable masses or tenderness  No scrotal swelling or erythema  Increased pubic adipose tissue  Musculoskeletal:         General: Normal range of motion        Cervical back: Normal range of motion  Skin:     General: Skin is warm and dry  Neurological:      General: No focal deficit present  Mental Status: He is alert and oriented to person, place, and time  Psychiatric:         Mood and Affect: Mood normal          Behavior: Behavior normal          Thought Content:  Thought content normal          Judgment: Judgment normal            Results  Recent Results (from the past 1 hour(s))   POCT urine dip    Collection Time: 02/28/22  8:40 AM   Result Value Ref Range    LEUKOCYTE ESTERASE,UA -     NITRITE,UA -     SL AMB POCT UROBILINOGEN 0 2     POCT URINE PROTEIN -      PH,UA 7 0     BLOOD,UA -     SPECIFIC GRAVITY,UA 1 010     KETONES,UA -     BILIRUBIN,UA -     GLUCOSE, UA -      COLOR,UA yellow     CLARITY,UA clear    ]  No results found for: PSA  No results found for: GLUCOSE, CALCIUM, NA, K, CO2, CL, BUN, CREATININE  No results found for: WBC, HGB, HCT, MCV, PLT        Orders  Orders Placed This Encounter   Procedures    POCT urine dip       Jm Wilkinson PA-C

## 2022-02-28 ENCOUNTER — OFFICE VISIT (OUTPATIENT)
Dept: UROLOGY | Facility: CLINIC | Age: 47
End: 2022-02-28
Payer: COMMERCIAL

## 2022-02-28 VITALS
WEIGHT: 305 LBS | SYSTOLIC BLOOD PRESSURE: 114 MMHG | HEART RATE: 56 BPM | OXYGEN SATURATION: 98 % | DIASTOLIC BLOOD PRESSURE: 88 MMHG | BODY MASS INDEX: 49.02 KG/M2 | HEIGHT: 66 IN

## 2022-02-28 DIAGNOSIS — N50.812 TESTICULAR PAIN, LEFT: Primary | ICD-10-CM

## 2022-02-28 LAB
SL AMB  POCT GLUCOSE, UA: NORMAL
SL AMB LEUKOCYTE ESTERASE,UA: NORMAL
SL AMB POCT BILIRUBIN,UA: NORMAL
SL AMB POCT BLOOD,UA: NORMAL
SL AMB POCT CLARITY,UA: CLEAR
SL AMB POCT COLOR,UA: YELLOW
SL AMB POCT KETONES,UA: NORMAL
SL AMB POCT NITRITE,UA: NORMAL
SL AMB POCT PH,UA: 7
SL AMB POCT SPECIFIC GRAVITY,UA: 1.01
SL AMB POCT URINE PROTEIN: NORMAL
SL AMB POCT UROBILINOGEN: 0.2

## 2022-02-28 PROCEDURE — 99203 OFFICE O/P NEW LOW 30 MIN: CPT | Performed by: PHYSICIAN ASSISTANT

## 2022-02-28 PROCEDURE — 81002 URINALYSIS NONAUTO W/O SCOPE: CPT | Performed by: PHYSICIAN ASSISTANT

## 2022-03-29 ENCOUNTER — OFFICE VISIT (OUTPATIENT)
Dept: OBGYN CLINIC | Facility: CLINIC | Age: 47
End: 2022-03-29
Payer: COMMERCIAL

## 2022-03-29 VITALS
HEART RATE: 76 BPM | SYSTOLIC BLOOD PRESSURE: 144 MMHG | WEIGHT: 305 LBS | BODY MASS INDEX: 49.02 KG/M2 | DIASTOLIC BLOOD PRESSURE: 85 MMHG | HEIGHT: 66 IN

## 2022-03-29 DIAGNOSIS — S83.241A TEAR OF MEDIAL MENISCUS OF RIGHT KNEE, CURRENT, UNSPECIFIED TEAR TYPE, INITIAL ENCOUNTER: ICD-10-CM

## 2022-03-29 DIAGNOSIS — M17.11 PRIMARY OSTEOARTHRITIS OF RIGHT KNEE: Primary | ICD-10-CM

## 2022-03-29 PROCEDURE — 99213 OFFICE O/P EST LOW 20 MIN: CPT | Performed by: PHYSICIAN ASSISTANT

## 2022-03-29 PROCEDURE — 20610 DRAIN/INJ JOINT/BURSA W/O US: CPT | Performed by: PHYSICIAN ASSISTANT

## 2022-03-29 RX ADMIN — BUPIVACAINE HYDROCHLORIDE 2 ML: 2.5 INJECTION, SOLUTION INFILTRATION; PERINEURAL at 16:11

## 2022-03-29 RX ADMIN — LIDOCAINE HYDROCHLORIDE 2 ML: 10 INJECTION, SOLUTION INFILTRATION; PERINEURAL at 16:11

## 2022-03-29 RX ADMIN — METHYLPREDNISOLONE ACETATE 2 ML: 40 INJECTION, SUSPENSION INTRA-ARTICULAR; INTRALESIONAL; INTRAMUSCULAR; SOFT TISSUE at 16:11

## 2022-03-29 NOTE — PROGRESS NOTES
Patient Name:  Daisy Diaz  MRN:  [de-identified]    Assessment & Plan     1  Primary osteoarthritis of right knee  -     Large joint arthrocentesis: R knee    2  Tear of medial meniscus of right knee, current, unspecified tear type, initial encounter  -     Large joint arthrocentesis: R knee      40 y o  male with Right knee osteoarthritis, history of medial meniscus tear and medial hamstring tendonitis  In light of patients recent exacerbation of Right knee pain, offered corticosteroid injection and advised patient to continue to perform exercises as prescribed by outpatient PT  Unable to elicit moderate pain with palpation and special testing today and would recommend against surgical intervention at this time  If pain persists despite corticosteroid injection, outpatient PT, OTC tylenol, can consider Right knee arthroscopy, partial medial menisectomy  Verbalized understanding and would like to proceed with injection  Advised patient regardless of location of injection, superolateral, anteromedial, or anterolateral, medication disperses throughout knee joint  Also mentioned corticosteroid injections do not provide pain relief to hamstring tenderness; this is through PT and home exercises with additiona modalities including ice/heat, topical analgesics as needed  Risks and benefits of corticosteroid injection were discussed in detail  Risks including:  Post injection pain, elevation in blood sugar, skin discoloration, fatty atrophy, and infection were discussed in detail  The patient understood and elected to proceed forward  After sterile preparation the Right was injected with 2 cc of 1% lidocaine, 2 cc of 0 25% bupivacaine, and 2 cc of Depo-Medrol  The patient tolerated the procedure and no immediate complications were noted    The patient was instructed to ice and elevate the injection site, limit strenuous activity for the next 24-48 hours, and contact us if there were any questions or concerns prior to their follow-up appointment  I will see the patient back in 8 weeks for reevaluation of Right knee and possible discussion of surgical intervention of pain symptoms persist       History of the Present Illness   Veronica Valentin is a 40 y o  male with Right knee osteoarthritis and history of medial meniscus tear  Today, patient reports mild worsening of Right knee pain since "weather started to get nicer and I started to do a little more"  He admits he does a lot of kneeling and ambulating throughout his day at work  He continues to locate his pain to medial aspect of Right knee with stair and long distance ambulation  He is considering corticosteroid injection, but would like it placed on inside of the knee  He states last injection on 03/29/2021, it was given on the outside of the knee and decreased lateral knee pain, not medial knee pain  Denies locking or swelling of Right knee  Review of Systems     Review of Systems   Constitutional: Negative for chills and fever  HENT: Negative for ear pain and sore throat  Eyes: Negative for pain and visual disturbance  Respiratory: Negative for cough and shortness of breath  Cardiovascular: Negative for chest pain and palpitations  Gastrointestinal: Negative for abdominal pain and vomiting  Genitourinary: Negative for dysuria and hematuria  Musculoskeletal: Negative for arthralgias and back pain  Skin: Negative for color change and rash  Neurological: Negative for seizures and syncope  All other systems reviewed and are negative  Physical Exam     /85   Pulse 76   Ht 5' 6" (1 676 m)   Wt (!) 138 kg (305 lb)   BMI 49 23 kg/m²     Right Knee  Range of motion from 0 to 115-120  There is no effusion  There is no tenderness over the knee or medial hamstrings   The patient is ableperform a straight leg raise      Varus stress testing reveals no pain or laxity at 0 and 30 degrees   Valgus stress testing reveals no pain or laxity at 0 and 30 degrees  Negative Kirk  The patient is neurovascular intact distally  Data Review     I have personally reviewed pertinent films in PACS, and my interpretation follows      No new images    Social History     Tobacco Use    Smoking status: Never Smoker    Smokeless tobacco: Never Used   Vaping Use    Vaping Use: Never used   Substance Use Topics    Alcohol use: No    Drug use: No           Large joint arthrocentesis: R knee  Universal Protocol:  Consent given by: patient  Patient identity confirmed: verbally with patient    Procedure Details  Location: knee - R knee  Needle size: 22 G  Approach: lateral  Medications administered: 2 mL bupivacaine 0 25 %; 2 mL lidocaine 1 %; 2 mL methylPREDNISolone acetate 40 mg/mL    Patient tolerance: patient tolerated the procedure well with no immediate complications  Dressing:  Sterile dressing applied          Jose Vazquez PA-C

## 2022-04-19 RX ORDER — LIDOCAINE HYDROCHLORIDE 10 MG/ML
2 INJECTION, SOLUTION INFILTRATION; PERINEURAL
Status: COMPLETED | OUTPATIENT
Start: 2022-03-29 | End: 2022-03-29

## 2022-04-19 RX ORDER — METHYLPREDNISOLONE ACETATE 40 MG/ML
2 INJECTION, SUSPENSION INTRA-ARTICULAR; INTRALESIONAL; INTRAMUSCULAR; SOFT TISSUE
Status: COMPLETED | OUTPATIENT
Start: 2022-03-29 | End: 2022-03-29

## 2022-04-19 RX ORDER — BUPIVACAINE HYDROCHLORIDE 2.5 MG/ML
2 INJECTION, SOLUTION INFILTRATION; PERINEURAL
Status: COMPLETED | OUTPATIENT
Start: 2022-03-29 | End: 2022-03-29

## 2022-06-28 ENCOUNTER — OFFICE VISIT (OUTPATIENT)
Dept: OBGYN CLINIC | Facility: CLINIC | Age: 47
End: 2022-06-28
Payer: COMMERCIAL

## 2022-06-28 VITALS
HEIGHT: 66 IN | HEART RATE: 74 BPM | BODY MASS INDEX: 49.02 KG/M2 | DIASTOLIC BLOOD PRESSURE: 88 MMHG | WEIGHT: 305 LBS | SYSTOLIC BLOOD PRESSURE: 132 MMHG

## 2022-06-28 DIAGNOSIS — M17.11 PRIMARY OSTEOARTHRITIS OF RIGHT KNEE: Primary | ICD-10-CM

## 2022-06-28 DIAGNOSIS — S83.241A TEAR OF MEDIAL MENISCUS OF RIGHT KNEE, CURRENT, UNSPECIFIED TEAR TYPE, INITIAL ENCOUNTER: ICD-10-CM

## 2022-06-28 PROCEDURE — 99213 OFFICE O/P EST LOW 20 MIN: CPT | Performed by: ORTHOPAEDIC SURGERY

## 2022-06-28 RX ORDER — METHYLPREDNISOLONE 4 MG/1
TABLET ORAL
Qty: 1 EACH | Refills: 0 | Status: SHIPPED | OUTPATIENT
Start: 2022-06-28

## 2022-06-28 NOTE — PROGRESS NOTES
Patient Name:  Evaristo Jacome  MRN:  [de-identified]    Assessment & Plan     1  Primary osteoarthritis of right knee    2  Tear of medial meniscus of right knee, current, unspecified tear type, initial encounter        40 y o  male with Right knee osteoarthritis and history of medial meniscus tear  Overall, patient without pain relief from previous injection  At this time, discussed with patient future treatment options in regards to Right knee pain  Nonoperative treatment including additional injections, corticosteroid vs visco, OTC oral analgesics, weight loss with activity and dietary modifications  Can also consider MRI of Right knee as last imaging was performed 03/2021  At this time, will prescribe medrol dose pack to decrease Right knee pain/inflammation for possible extraarticular etiology of pain  If no pain relief from PO steroids, can consider XR of Right hip and low back and possible MRI of Right knee to evaluate internal derangement or progression of previously noted medial meniscus tear  Verbalized understanding of the above and will follow up in office in 6-8 weeks for reevaluation  History of the Present Illness   Evaristo Jacome is a 40 y o  male with Right knee osteoarthritis and history of medial meniscus tear s/p corticosteroid injection on 03/29/2022  Today, patient reports no pain relief from injection  Continues to locate pain to medial aspect of Right knee, specifically with function, deep knee flexion and kneeling  He does wear knee pads while at work which do help with his pain  Patient admits to exacerbation of pain with standing after prolonged sitting and prolonged ambulation  Review of Systems     Review of Systems   Constitutional: Negative for chills and fever  HENT: Negative for ear pain and sore throat  Eyes: Negative for pain and visual disturbance  Respiratory: Negative for cough and shortness of breath  Cardiovascular: Negative for chest pain and palpitations  Gastrointestinal: Negative for abdominal pain and vomiting  Genitourinary: Negative for dysuria and hematuria  Musculoskeletal: Negative for arthralgias and back pain  Skin: Negative for color change and rash  Neurological: Negative for seizures and syncope  All other systems reviewed and are negative  Physical Exam     /88   Pulse 74   Ht 5' 6" (1 676 m)   Wt (!) 138 kg (305 lb)   BMI 49 23 kg/m²     Right Knee  Range of motion from 0 to 120  There is no effusion  There is no tenderness over the medial or lateral joint line  The patient is able to perform a straight leg raise with 5/5 quad strength  Negative Kirk test  Negative Thessaly test  Varus stress testing reveals no pain or laxity at 0 and 30 degrees   Valgus stress testing reveals no pain or laxity at 0 and 30 degrees  The patient is neurovascular intact distally  Data Review     I have personally reviewed pertinent films in PACS, and my interpretation follows      No new images    Social History     Tobacco Use    Smoking status: Never Smoker    Smokeless tobacco: Never Used   Vaping Use    Vaping Use: Never used   Substance Use Topics    Alcohol use: No    Drug use: No           Procedures  None     Milo Geiger PA-C

## 2022-08-22 NOTE — PROGRESS NOTES
Patient Name:  Chilo Elliott  MRN:  [de-identified]    Assessment & Plan     1  Tear of medial meniscus of right knee, current, unspecified tear type, subsequent encounter    2  Chronic pain of right knee    3  Primary osteoarthritis of right knee        40 y o  male with Right knee pain with contributions from early degenerative changes and meniscal tear medially  X-rays and MRI once again reviewed discussed with the patient  We did discuss the fact that he has persistent pain despite non operative treatment including oral analgesics, activity modification, formal physical therapy, home exercise program, and injection therapy  Some of his symptoms do seem to be related to medial meniscus tear though some his pain is likely patellofemoral in origin  We discussed at length diet modifications and weight loss and weight loss as a means to decrease his pain  We also discussed further non operative intervention with repeat injections, though he has had limited improvement up to this point  We also discussed possibility of surgical intervention in the form of right knee arthroscopy with partial medial meniscectomy  I did discuss that I expect this to improve some of his symptoms though he may still have some residual pain contributed to early degenerative factors and his weight even with surgical intervention  Risks of surgery were discussed including, but not limited to, infection, postoperative stiffness, need for subsequent surgery, persistent pain, nerve and blood vessel injury, and anesthesia complications  Patient understands  He would like to discuss matters further with his wife  He will contact us back at the office for further discussion whether not he would like to proceed forward with surgical intervention  History of the Present Illness   Chilo Elliott is a 40 y o  male with Right knee osteoarthritis and history of medial meniscus tear   Today, patient reports persistent knee pain unchanged from his last visit  He reports pain is worse with long periods of walking or when sitting to drive for long periods of time  Pain is located over the anterior and medial aspects of his knee  He denies any locking or other mechanical symptoms  He denies any discrete swelling  Pain still does seem to be worse with twisting though he has difficulty reproducing it  No other new complaints  Review of Systems     Review of Systems   Constitutional: Negative for appetite change and unexpected weight change  HENT: Negative for congestion and trouble swallowing  Eyes: Negative for visual disturbance  Respiratory: Negative for cough and shortness of breath  Cardiovascular: Negative for chest pain and palpitations  Gastrointestinal: Negative for nausea and vomiting  Endocrine: Negative for cold intolerance and heat intolerance  Musculoskeletal: Negative for gait problem and myalgias  Skin: Negative for rash  Neurological: Negative for numbness  Physical Exam     /80   Pulse 76   Ht 5' 6" (1 676 m)   Wt (!) 138 kg (305 lb)   BMI 49 23 kg/m²     Right Knee  Range of motion from 0 to 120  There is no effusion  There is mild tenderness over the anterior medial joint line  The patient is able to perform a straight leg raise  5/5 quadriceps strength  Kirk and Thessaly testing are equivocal  Negative patellar grind  Varus stress testing reveals no instability at 0 and 30 degrees   Valgus stress testing reveals no instability at 0 and 30 degrees  The patient is neurovascular intact distally  Data Review     I have personally reviewed pertinent films in PACS, and my interpretation follows  X-rays taken previously of Right knee demonstrates mild patellofemoral degenerative changes without acute fracture or dislocation      Social History     Tobacco Use    Smoking status: Never Smoker    Smokeless tobacco: Never Used   Vaping Use    Vaping Use: Never used   Substance Use Topics    Alcohol use: No    Drug use: No           Procedures      Fuad Swann,

## 2022-08-23 ENCOUNTER — OFFICE VISIT (OUTPATIENT)
Dept: OBGYN CLINIC | Facility: CLINIC | Age: 47
End: 2022-08-23
Payer: COMMERCIAL

## 2022-08-23 VITALS
HEIGHT: 66 IN | DIASTOLIC BLOOD PRESSURE: 80 MMHG | WEIGHT: 305 LBS | BODY MASS INDEX: 49.02 KG/M2 | SYSTOLIC BLOOD PRESSURE: 132 MMHG | HEART RATE: 76 BPM

## 2022-08-23 DIAGNOSIS — S83.241D TEAR OF MEDIAL MENISCUS OF RIGHT KNEE, CURRENT, UNSPECIFIED TEAR TYPE, SUBSEQUENT ENCOUNTER: Primary | ICD-10-CM

## 2022-08-23 DIAGNOSIS — G89.29 CHRONIC PAIN OF RIGHT KNEE: ICD-10-CM

## 2022-08-23 DIAGNOSIS — M25.561 CHRONIC PAIN OF RIGHT KNEE: ICD-10-CM

## 2022-08-23 DIAGNOSIS — M17.11 PRIMARY OSTEOARTHRITIS OF RIGHT KNEE: ICD-10-CM

## 2022-08-23 PROCEDURE — 99214 OFFICE O/P EST MOD 30 MIN: CPT | Performed by: ORTHOPAEDIC SURGERY

## 2022-09-26 ENCOUNTER — APPOINTMENT (OUTPATIENT)
Dept: LAB | Facility: CLINIC | Age: 47
End: 2022-09-26
Payer: COMMERCIAL

## 2022-09-26 DIAGNOSIS — M25.50 PAIN IN JOINT, MULTIPLE SITES: ICD-10-CM

## 2022-09-26 DIAGNOSIS — N41.9 PROSTATITIS, UNSPECIFIED PROSTATITIS TYPE: ICD-10-CM

## 2022-09-26 DIAGNOSIS — A69.20 LYME DISEASE: ICD-10-CM

## 2022-09-26 DIAGNOSIS — R79.9 ABNORMAL BLOOD CHEMISTRY: ICD-10-CM

## 2022-09-26 DIAGNOSIS — Z12.5 SPECIAL SCREENING FOR MALIGNANT NEOPLASM OF PROSTATE: ICD-10-CM

## 2022-09-26 LAB
ALBUMIN SERPL BCP-MCNC: 3.7 G/DL (ref 3.5–5)
ALP SERPL-CCNC: 77 U/L (ref 46–116)
ALT SERPL W P-5'-P-CCNC: 46 U/L (ref 12–78)
ANION GAP SERPL CALCULATED.3IONS-SCNC: 5 MMOL/L (ref 4–13)
AST SERPL W P-5'-P-CCNC: 16 U/L (ref 5–45)
BASOPHILS # BLD AUTO: 0.03 THOUSANDS/ΜL (ref 0–0.1)
BASOPHILS NFR BLD AUTO: 1 % (ref 0–1)
BILIRUB SERPL-MCNC: 0.38 MG/DL (ref 0.2–1)
BUN SERPL-MCNC: 18 MG/DL (ref 5–25)
CALCIUM SERPL-MCNC: 9.6 MG/DL (ref 8.3–10.1)
CHLORIDE SERPL-SCNC: 105 MMOL/L (ref 96–108)
CHOLEST SERPL-MCNC: 242 MG/DL
CO2 SERPL-SCNC: 27 MMOL/L (ref 21–32)
CREAT SERPL-MCNC: 0.89 MG/DL (ref 0.6–1.3)
EOSINOPHIL # BLD AUTO: 0.35 THOUSAND/ΜL (ref 0–0.61)
EOSINOPHIL NFR BLD AUTO: 7 % (ref 0–6)
ERYTHROCYTE [DISTWIDTH] IN BLOOD BY AUTOMATED COUNT: 12.4 % (ref 11.6–15.1)
GFR SERPL CREATININE-BSD FRML MDRD: 103 ML/MIN/1.73SQ M
GLUCOSE P FAST SERPL-MCNC: 99 MG/DL (ref 65–99)
HCT VFR BLD AUTO: 44.4 % (ref 36.5–49.3)
HDLC SERPL-MCNC: 33 MG/DL
HGB BLD-MCNC: 15 G/DL (ref 12–17)
IMM GRANULOCYTES # BLD AUTO: 0.01 THOUSAND/UL (ref 0–0.2)
IMM GRANULOCYTES NFR BLD AUTO: 0 % (ref 0–2)
LDLC SERPL CALC-MCNC: 181 MG/DL (ref 0–100)
LYMPHOCYTES # BLD AUTO: 1.45 THOUSANDS/ΜL (ref 0.6–4.47)
LYMPHOCYTES NFR BLD AUTO: 30 % (ref 14–44)
MCH RBC QN AUTO: 29 PG (ref 26.8–34.3)
MCHC RBC AUTO-ENTMCNC: 33.8 G/DL (ref 31.4–37.4)
MCV RBC AUTO: 86 FL (ref 82–98)
MONOCYTES # BLD AUTO: 0.44 THOUSAND/ΜL (ref 0.17–1.22)
MONOCYTES NFR BLD AUTO: 9 % (ref 4–12)
NEUTROPHILS # BLD AUTO: 2.55 THOUSANDS/ΜL (ref 1.85–7.62)
NEUTS SEG NFR BLD AUTO: 53 % (ref 43–75)
NONHDLC SERPL-MCNC: 209 MG/DL
NRBC BLD AUTO-RTO: 0 /100 WBCS
PLATELET # BLD AUTO: 291 THOUSANDS/UL (ref 149–390)
PMV BLD AUTO: 10.6 FL (ref 8.9–12.7)
POTASSIUM SERPL-SCNC: 4.1 MMOL/L (ref 3.5–5.3)
PROT SERPL-MCNC: 7.8 G/DL (ref 6.4–8.4)
PSA SERPL-MCNC: 0.6 NG/ML (ref 0–4)
RBC # BLD AUTO: 5.17 MILLION/UL (ref 3.88–5.62)
SODIUM SERPL-SCNC: 137 MMOL/L (ref 135–147)
T3 SERPL-MCNC: 1.5 NG/ML (ref 0.6–1.8)
T4 SERPL-MCNC: 8.9 UG/DL (ref 4.7–13.3)
TRIGL SERPL-MCNC: 141 MG/DL
TSH SERPL DL<=0.05 MIU/L-ACNC: 2.37 UIU/ML (ref 0.45–4.5)
URATE SERPL-MCNC: 8 MG/DL (ref 3.5–8.5)
WBC # BLD AUTO: 4.83 THOUSAND/UL (ref 4.31–10.16)

## 2022-09-26 PROCEDURE — G0103 PSA SCREENING: HCPCS

## 2022-09-26 PROCEDURE — 84480 ASSAY TRIIODOTHYRONINE (T3): CPT

## 2022-09-26 PROCEDURE — 84550 ASSAY OF BLOOD/URIC ACID: CPT

## 2022-09-26 PROCEDURE — 80053 COMPREHEN METABOLIC PANEL: CPT

## 2022-09-26 PROCEDURE — 84443 ASSAY THYROID STIM HORMONE: CPT

## 2022-09-26 PROCEDURE — 86618 LYME DISEASE ANTIBODY: CPT

## 2022-09-26 PROCEDURE — 80061 LIPID PANEL: CPT

## 2022-09-26 PROCEDURE — 84436 ASSAY OF TOTAL THYROXINE: CPT

## 2022-09-26 PROCEDURE — 36415 COLL VENOUS BLD VENIPUNCTURE: CPT

## 2022-09-26 PROCEDURE — 85025 COMPLETE CBC W/AUTO DIFF WBC: CPT

## 2022-09-27 LAB — B BURGDOR IGG+IGM SER-ACNC: 0.5 AI

## 2023-02-15 ENCOUNTER — TRANSCRIBE ORDERS (OUTPATIENT)
Dept: SURGERY | Facility: CLINIC | Age: 48
End: 2023-02-15

## 2023-02-15 DIAGNOSIS — L72.9 SUBCUTANEOUS CYST: Primary | ICD-10-CM

## 2023-03-01 ENCOUNTER — CONSULT (OUTPATIENT)
Dept: SURGERY | Facility: CLINIC | Age: 48
End: 2023-03-01

## 2023-03-01 VITALS
WEIGHT: 308 LBS | TEMPERATURE: 98 F | OXYGEN SATURATION: 80 % | HEART RATE: 80 BPM | DIASTOLIC BLOOD PRESSURE: 86 MMHG | RESPIRATION RATE: 18 BRPM | SYSTOLIC BLOOD PRESSURE: 118 MMHG | BODY MASS INDEX: 49.71 KG/M2

## 2023-03-01 DIAGNOSIS — L72.0 EPIDERMOID CYST OF SKIN OF BACK: Primary | ICD-10-CM

## 2023-03-01 DIAGNOSIS — L72.9 SUBCUTANEOUS CYST: ICD-10-CM

## 2023-03-01 RX ORDER — KETOCONAZOLE 20 MG/G
CREAM TOPICAL
COMMUNITY
Start: 2023-02-07

## 2023-03-01 NOTE — LETTER
March 1, 2023     Irasema Caro, [address]  [facility]    Patient: Katelin Rodarte   YOB: 1975   Date of Visit: 3/1/2023       Dear Dr Jemal Caceres: Thank you for referring Tommy Puentes to me for evaluation  Below are my notes for this consultation  If you have questions, please do not hesitate to call me  I look forward to following your patient along with you  Sincerely,        Maximilian Clark MD        CC: Teresa Knutson, DO Maximilian Clark MD  3/1/2023  9:51 AM  Sign when Signing Visit  Assessment/Plan:    Epidermoid cyst of skin of back  Patient is presenting with a chronically inflamed epidermoid cyst of the upper left back for which definitive treatment by excisional biopsy is now indicated  The technical details of the procedure as well as the options benefits risks and alternatives were thoroughly discussed including the option for no excision  Specific risks related to anesthesia, bleeding and infection all discussed  All questions answered to the satisfaction of the patient and informed consent obtained to proceed  Subjective:     Patient ID: Katelin Rodarte is a 56 y o  male  Pt is coming in the office for Cyst on upper back measuring 2 cm  Patient has had it for a couple of months now no change in size  He has done hot compress no change  No fever/chills YESY Cassidy MA          Review of Systems   Constitutional: Negative for chills and fever  HENT: Negative for ear pain and sore throat  Eyes: Negative for pain and visual disturbance  Respiratory: Negative for cough and shortness of breath  Cardiovascular: Negative for chest pain and palpitations  Gastrointestinal: Negative for abdominal pain and vomiting  Genitourinary: Negative for dysuria and hematuria  Musculoskeletal: Negative for arthralgias and back pain  Skin: Negative for color change and rash     Neurological: Negative for seizures and syncope  All other systems reviewed and are negative  Objective:      /86 (BP Location: Left arm, Patient Position: Sitting, Cuff Size: Large)   Pulse 80   Temp 98 °F (36 7 °C) (Temporal)   Resp 18   Wt (!) 140 kg (308 lb)   SpO2 (!) 80%   BMI 49 71 kg/m²           Physical Exam  Vitals and nursing note reviewed  Constitutional:       Appearance: He is well-developed  HENT:      Head: Normocephalic and atraumatic  Eyes:      Conjunctiva/sclera: Conjunctivae normal       Pupils: Pupils are equal, round, and reactive to light  Cardiovascular:      Rate and Rhythm: Normal rate and regular rhythm  Pulmonary:      Effort: Pulmonary effort is normal       Breath sounds: Normal breath sounds  Abdominal:      General: Bowel sounds are normal       Palpations: Abdomen is soft  Musculoskeletal:         General: Normal range of motion  Cervical back: Normal range of motion and neck supple  Skin:     General: Skin is warm and dry  Comments: 12 mm chronically inflamed epidermoid cyst of the upper left back   Neurological:      Mental Status: He is alert and oriented to person, place, and time  Psychiatric:         Behavior: Behavior normal          Thought Content: Thought content normal          Judgment: Judgment normal       Skin excision    Date/Time: 3/1/2023 9:49 AM  Performed by: Richard Sharp MD  Authorized by: Richard Sharp MD   Universal Protocol:  Consent: Verbal consent obtained  Risks and benefits: risks, benefits and alternatives were discussed  Consent given by: patient  Time out: Immediately prior to procedure a "time out" was called to verify the correct patient, procedure, equipment, support staff and site/side marked as required    Timeout called at: 3/1/2023 9:49 AM   Patient understanding: patient states understanding of the procedure being performed  Patient consent: the patient's understanding of the procedure matches consent given  Site marked: the operative site was marked  Patient identity confirmed: verbally with patient      Procedure Details - Skin Excision:     Number of Lesions:  1  Lesion 1:     Body area:  Trunk    Trunk location:  Back    Malignancy: benign lesion            Final defect size (mm):  14    Repair type:  Linear closure    Closure complexity: intermediate       Repair Comments: Procedure note: With informed verbal consent under sterile conditions using aseptic technique using 1 75% bupivacaine the 12 mm upper left back epidermoid cyst was sharply excised with a 15 blade and the wound closed in 2 layers with figure-of-eight sutures of 4-0 Vicryl and vertical mattress sutures of 3-0 nylon

## 2023-03-01 NOTE — PROGRESS NOTES
Assessment/Plan:    Epidermoid cyst of skin of back  Patient is presenting with a chronically inflamed epidermoid cyst of the upper left back for which definitive treatment by excisional biopsy is now indicated  The technical details of the procedure as well as the options benefits risks and alternatives were thoroughly discussed including the option for no excision  Specific risks related to anesthesia, bleeding and infection all discussed  All questions answered to the satisfaction of the patient and informed consent obtained to proceed  Subjective:      Patient ID: Savi Lam is a 56 y o  male  Pt is coming in the office for Cyst on upper back measuring 2 cm  Patient has had it for a couple of months now no change in size  He has done hot compress no change  No fever/chills YESY Cassidy MA          Review of Systems   Constitutional: Negative for chills and fever  HENT: Negative for ear pain and sore throat  Eyes: Negative for pain and visual disturbance  Respiratory: Negative for cough and shortness of breath  Cardiovascular: Negative for chest pain and palpitations  Gastrointestinal: Negative for abdominal pain and vomiting  Genitourinary: Negative for dysuria and hematuria  Musculoskeletal: Negative for arthralgias and back pain  Skin: Negative for color change and rash  Neurological: Negative for seizures and syncope  All other systems reviewed and are negative  Objective:      /86 (BP Location: Left arm, Patient Position: Sitting, Cuff Size: Large)   Pulse 80   Temp 98 °F (36 7 °C) (Temporal)   Resp 18   Wt (!) 140 kg (308 lb)   SpO2 (!) 80%   BMI 49 71 kg/m²            Physical Exam  Vitals and nursing note reviewed  Constitutional:       Appearance: He is well-developed  HENT:      Head: Normocephalic and atraumatic  Eyes:      Conjunctiva/sclera: Conjunctivae normal       Pupils: Pupils are equal, round, and reactive to light  Cardiovascular:      Rate and Rhythm: Normal rate and regular rhythm  Pulmonary:      Effort: Pulmonary effort is normal       Breath sounds: Normal breath sounds  Abdominal:      General: Bowel sounds are normal       Palpations: Abdomen is soft  Musculoskeletal:         General: Normal range of motion  Cervical back: Normal range of motion and neck supple  Skin:     General: Skin is warm and dry  Comments: 12 mm chronically inflamed epidermoid cyst of the upper left back   Neurological:      Mental Status: He is alert and oriented to person, place, and time  Psychiatric:         Behavior: Behavior normal          Thought Content: Thought content normal          Judgment: Judgment normal        Skin excision    Date/Time: 3/1/2023 9:49 AM  Performed by: Chris Cuenca MD  Authorized by: Chris Cuenca MD   Universal Protocol:  Consent: Verbal consent obtained  Risks and benefits: risks, benefits and alternatives were discussed  Consent given by: patient  Time out: Immediately prior to procedure a "time out" was called to verify the correct patient, procedure, equipment, support staff and site/side marked as required  Timeout called at: 3/1/2023 9:49 AM   Patient understanding: patient states understanding of the procedure being performed  Patient consent: the patient's understanding of the procedure matches consent given  Site marked: the operative site was marked  Patient identity confirmed: verbally with patient      Procedure Details - Skin Excision:     Number of Lesions:  1  Lesion 1:     Body area:  Trunk    Trunk location:  Back    Malignancy: benign lesion            Final defect size (mm):  14    Repair type:  Linear closure    Closure complexity: intermediate       Repair Comments: Procedure note:  With informed verbal consent under sterile conditions using aseptic technique using 1 75% bupivacaine the 12 mm upper left back epidermoid cyst was sharply excised with a 15 blade and the wound closed in 2 layers with figure-of-eight sutures of 4-0 Vicryl and vertical mattress sutures of 3-0 nylon

## 2023-03-01 NOTE — ASSESSMENT & PLAN NOTE
Patient is presenting with a chronically inflamed epidermoid cyst of the upper left back for which definitive treatment by excisional biopsy is now indicated  The technical details of the procedure as well as the options benefits risks and alternatives were thoroughly discussed including the option for no excision  Specific risks related to anesthesia, bleeding and infection all discussed  All questions answered to the satisfaction of the patient and informed consent obtained to proceed

## 2023-03-07 ENCOUNTER — TELEPHONE (OUTPATIENT)
Dept: SURGERY | Facility: CLINIC | Age: 48
End: 2023-03-07

## 2023-03-13 ENCOUNTER — TELEPHONE (OUTPATIENT)
Dept: SURGERY | Facility: CLINIC | Age: 48
End: 2023-03-13

## 2023-03-13 NOTE — TELEPHONE ENCOUNTER
Called patient stating I noticed he canceled his appt and if he would like to reschedule? Patient states the appt was canceled due to the weather but thinks the weather might be fine on Wednesday and will have his wife to call to reschedule his appt

## 2023-03-15 ENCOUNTER — OFFICE VISIT (OUTPATIENT)
Dept: SURGERY | Facility: CLINIC | Age: 48
End: 2023-03-15

## 2023-03-15 VITALS — TEMPERATURE: 98 F

## 2023-03-15 DIAGNOSIS — L72.0 EPIDERMOID CYST OF SKIN OF BACK: Primary | ICD-10-CM

## 2023-03-15 NOTE — ASSESSMENT & PLAN NOTE
Some itching/irritation from sutures  On exam there is irritation from the suture retracting under the skin  Once removed no evidence of suture tract infection or deeper surgical site infection  Cleansed, neosporin/bandaid applied  Advised wound care until closed  Instructed to call back if symptoms worsen or wound fails to get better  I did not advise oral antibiotics at this time

## 2023-03-15 NOTE — PROGRESS NOTES
Post-Op Note - General Surgery   Savi Lam 56 y o  male MRN: [de-identified]  Encounter: [id number]    Assessment/Plan    Epidermoid cyst of skin of back  Some itching/irritation from sutures  On exam there is irritation from the suture retracting under the skin  Once removed no evidence of suture tract infection or deeper surgical site infection  Cleansed, neosporin/bandaid applied  Advised wound care until closed  Instructed to call back if symptoms worsen or wound fails to get better  I did not advise oral antibiotics at this time  Diagnoses and all orders for this visit:    Epidermoid cyst of skin of back    Other orders  -     ciclopirox (PENLAC) 8 % solution        Subjective      Chief Complaint   Patient presents with   • Suture / Staple Removal     Suture removal/ s/p exc of cyst on back 03/01/23     57 yo M here for follow-up after skin excision on his back  Doing well  Some irritation from the sutures with mild discharge latearally  Review of Systems   Skin: Positive for wound  The following portions of the patient's history were reviewed and updated as appropriate: allergies, current medications, past family history, past medical history, past social history, past surgical history and problem list     Objective      Temperature 98 °F (36 7 °C)  Physical Exam  Vitals and nursing note reviewed  Constitutional:       General: He is not in acute distress  Appearance: He is well-developed  He is not diaphoretic  HENT:      Head: Normocephalic and atraumatic  Eyes:      Conjunctiva/sclera: Conjunctivae normal       Pupils: Pupils are equal, round, and reactive to light  Pulmonary:      Effort: No respiratory distress  Musculoskeletal:         General: Normal range of motion  Cervical back: Normal range of motion  Skin:     General: Skin is warm and dry  Capillary Refill: Capillary refill takes less than 2 seconds  Comments: Incision upper left back   Incision well healed  Suture knots retracted under the skin with some irritation more laterally  No purulence, no fluctuance, no cellulitis  Sutures removed and wounds cleansed/treated with neosporin/bandaid  Neurological:      Mental Status: He is alert and oriented to person, place, and time     Psychiatric:         Behavior: Behavior normal          Signature:  Gorge Irby PA-C  Date: 3/15/2023 Time: 10:45 AM

## 2023-12-17 ENCOUNTER — HOSPITAL ENCOUNTER (OUTPATIENT)
Facility: HOSPITAL | Age: 48
Setting detail: OBSERVATION
Discharge: HOME | End: 2023-12-18
Attending: EMERGENCY MEDICINE | Admitting: INTERNAL MEDICINE
Payer: COMMERCIAL

## 2023-12-17 ENCOUNTER — APPOINTMENT (EMERGENCY)
Dept: RADIOLOGY | Facility: HOSPITAL | Age: 48
End: 2023-12-17
Payer: COMMERCIAL

## 2023-12-17 DIAGNOSIS — I21.3 STEMI (ST ELEVATION MYOCARDIAL INFARCTION) (HCC): ICD-10-CM

## 2023-12-17 DIAGNOSIS — I21.9 MI (MYOCARDIAL INFARCTION) (HCC): Primary | ICD-10-CM

## 2023-12-17 DIAGNOSIS — R07.9 CHEST PAIN: ICD-10-CM

## 2023-12-17 DIAGNOSIS — I31.9 PERICARDITIS: ICD-10-CM

## 2023-12-17 LAB
2HR DELTA HS TROPONIN: >1 NG/L
4HR DELTA HS TROPONIN: >13 NG/L
ALBUMIN SERPL BCP-MCNC: 4.4 G/DL (ref 3.5–5)
ALP SERPL-CCNC: 69 U/L (ref 34–104)
ALT SERPL W P-5'-P-CCNC: 23 U/L (ref 7–52)
ANION GAP SERPL CALCULATED.3IONS-SCNC: 7 MMOL/L
AST SERPL W P-5'-P-CCNC: 17 U/L (ref 13–39)
BASOPHILS # BLD AUTO: 0.03 THOUSANDS/ÂΜL (ref 0–0.1)
BASOPHILS NFR BLD AUTO: 0 % (ref 0–1)
BILIRUB DIRECT SERPL-MCNC: 0.14 MG/DL (ref 0–0.2)
BILIRUB SERPL-MCNC: 0.35 MG/DL (ref 0.2–1)
BUN SERPL-MCNC: 13 MG/DL (ref 5–25)
CALCIUM SERPL-MCNC: 9.5 MG/DL (ref 8.4–10.2)
CARDIAC TROPONIN I PNL SERPL HS: 15 NG/L
CARDIAC TROPONIN I PNL SERPL HS: 3 NG/L
CARDIAC TROPONIN I PNL SERPL HS: <2 NG/L
CHLORIDE SERPL-SCNC: 102 MMOL/L (ref 96–108)
CO2 SERPL-SCNC: 30 MMOL/L (ref 21–32)
CREAT SERPL-MCNC: 0.89 MG/DL (ref 0.6–1.3)
EOSINOPHIL # BLD AUTO: 0.25 THOUSAND/ÂΜL (ref 0–0.61)
EOSINOPHIL NFR BLD AUTO: 3 % (ref 0–6)
ERYTHROCYTE [DISTWIDTH] IN BLOOD BY AUTOMATED COUNT: 12.3 % (ref 11.6–15.1)
GFR SERPL CREATININE-BSD FRML MDRD: 101 ML/MIN/1.73SQ M
GLUCOSE SERPL-MCNC: 155 MG/DL (ref 65–140)
HCT VFR BLD AUTO: 44.3 % (ref 36.5–49.3)
HGB BLD-MCNC: 14.6 G/DL (ref 12–17)
IMM GRANULOCYTES # BLD AUTO: 0.03 THOUSAND/UL (ref 0–0.2)
IMM GRANULOCYTES NFR BLD AUTO: 0 % (ref 0–2)
LYMPHOCYTES # BLD AUTO: 1.35 THOUSANDS/ÂΜL (ref 0.6–4.47)
LYMPHOCYTES NFR BLD AUTO: 16 % (ref 14–44)
MCH RBC QN AUTO: 28.6 PG (ref 26.8–34.3)
MCHC RBC AUTO-ENTMCNC: 33 G/DL (ref 31.4–37.4)
MCV RBC AUTO: 87 FL (ref 82–98)
MONOCYTES # BLD AUTO: 0.65 THOUSAND/ÂΜL (ref 0.17–1.22)
MONOCYTES NFR BLD AUTO: 8 % (ref 4–12)
NEUTROPHILS # BLD AUTO: 6.17 THOUSANDS/ÂΜL (ref 1.85–7.62)
NEUTS SEG NFR BLD AUTO: 73 % (ref 43–75)
NRBC BLD AUTO-RTO: 0 /100 WBCS
PLATELET # BLD AUTO: 281 THOUSANDS/UL (ref 149–390)
PMV BLD AUTO: 9.6 FL (ref 8.9–12.7)
POTASSIUM SERPL-SCNC: 3.8 MMOL/L (ref 3.5–5.3)
PROT SERPL-MCNC: 8 G/DL (ref 6.4–8.4)
RBC # BLD AUTO: 5.11 MILLION/UL (ref 3.88–5.62)
SODIUM SERPL-SCNC: 139 MMOL/L (ref 135–147)
WBC # BLD AUTO: 8.48 THOUSAND/UL (ref 4.31–10.16)

## 2023-12-17 PROCEDURE — 96374 THER/PROPH/DIAG INJ IV PUSH: CPT

## 2023-12-17 PROCEDURE — 80076 HEPATIC FUNCTION PANEL: CPT | Performed by: EMERGENCY MEDICINE

## 2023-12-17 PROCEDURE — 96375 TX/PRO/DX INJ NEW DRUG ADDON: CPT

## 2023-12-17 PROCEDURE — C1887 CATHETER, GUIDING: HCPCS | Performed by: INTERNAL MEDICINE

## 2023-12-17 PROCEDURE — 93458 L HRT ARTERY/VENTRICLE ANGIO: CPT | Performed by: INTERNAL MEDICINE

## 2023-12-17 PROCEDURE — 99291 CRITICAL CARE FIRST HOUR: CPT | Performed by: EMERGENCY MEDICINE

## 2023-12-17 PROCEDURE — 99223 1ST HOSP IP/OBS HIGH 75: CPT | Performed by: INTERNAL MEDICINE

## 2023-12-17 PROCEDURE — 71045 X-RAY EXAM CHEST 1 VIEW: CPT

## 2023-12-17 PROCEDURE — 99285 EMERGENCY DEPT VISIT HI MDM: CPT

## 2023-12-17 PROCEDURE — 36415 COLL VENOUS BLD VENIPUNCTURE: CPT | Performed by: EMERGENCY MEDICINE

## 2023-12-17 PROCEDURE — 85025 COMPLETE CBC W/AUTO DIFF WBC: CPT | Performed by: EMERGENCY MEDICINE

## 2023-12-17 PROCEDURE — 99152 MOD SED SAME PHYS/QHP 5/>YRS: CPT | Performed by: INTERNAL MEDICINE

## 2023-12-17 PROCEDURE — 93005 ELECTROCARDIOGRAM TRACING: CPT

## 2023-12-17 PROCEDURE — 84484 ASSAY OF TROPONIN QUANT: CPT | Performed by: EMERGENCY MEDICINE

## 2023-12-17 PROCEDURE — C1769 GUIDE WIRE: HCPCS | Performed by: INTERNAL MEDICINE

## 2023-12-17 PROCEDURE — 80048 BASIC METABOLIC PNL TOTAL CA: CPT | Performed by: EMERGENCY MEDICINE

## 2023-12-17 PROCEDURE — C1894 INTRO/SHEATH, NON-LASER: HCPCS | Performed by: INTERNAL MEDICINE

## 2023-12-17 RX ORDER — IODIXANOL 320 MG/ML
INJECTION, SOLUTION INTRAVASCULAR CODE/TRAUMA/SEDATION MEDICATION
Status: DISCONTINUED | OUTPATIENT
Start: 2023-12-17 | End: 2023-12-17 | Stop reason: HOSPADM

## 2023-12-17 RX ORDER — FAMOTIDINE 10 MG/ML
20 INJECTION, SOLUTION INTRAVENOUS ONCE
Status: COMPLETED | OUTPATIENT
Start: 2023-12-17 | End: 2023-12-17

## 2023-12-17 RX ORDER — LIDOCAINE HYDROCHLORIDE 10 MG/ML
INJECTION, SOLUTION EPIDURAL; INFILTRATION; INTRACAUDAL; PERINEURAL CODE/TRAUMA/SEDATION MEDICATION
Status: DISCONTINUED | OUTPATIENT
Start: 2023-12-17 | End: 2023-12-17 | Stop reason: HOSPADM

## 2023-12-17 RX ORDER — ROSUVASTATIN CALCIUM 10 MG/1
10 TABLET, COATED ORAL DAILY
COMMUNITY

## 2023-12-17 RX ORDER — ASPIRIN 81 MG/1
324 TABLET, CHEWABLE ORAL ONCE
Status: COMPLETED | OUTPATIENT
Start: 2023-12-17 | End: 2023-12-17

## 2023-12-17 RX ORDER — FENTANYL CITRATE 50 UG/ML
INJECTION, SOLUTION INTRAMUSCULAR; INTRAVENOUS CODE/TRAUMA/SEDATION MEDICATION
Status: DISCONTINUED | OUTPATIENT
Start: 2023-12-17 | End: 2023-12-17 | Stop reason: HOSPADM

## 2023-12-17 RX ORDER — VERAPAMIL HCL 2.5 MG/ML
AMPUL (ML) INTRAVENOUS CODE/TRAUMA/SEDATION MEDICATION
Status: DISCONTINUED | OUTPATIENT
Start: 2023-12-17 | End: 2023-12-17 | Stop reason: HOSPADM

## 2023-12-17 RX ORDER — HEPARIN SODIUM 1000 [USP'U]/ML
4000 INJECTION, SOLUTION INTRAVENOUS; SUBCUTANEOUS ONCE
Status: COMPLETED | OUTPATIENT
Start: 2023-12-17 | End: 2023-12-17

## 2023-12-17 RX ORDER — SODIUM CHLORIDE 9 MG/ML
3 INJECTION INTRAVENOUS
Status: DISCONTINUED | OUTPATIENT
Start: 2023-12-17 | End: 2023-12-18 | Stop reason: HOSPADM

## 2023-12-17 RX ORDER — OXYCODONE HYDROCHLORIDE AND ACETAMINOPHEN 5; 325 MG/1; MG/1
1 TABLET ORAL EVERY 4 HOURS PRN
Status: DISCONTINUED | OUTPATIENT
Start: 2023-12-17 | End: 2023-12-18 | Stop reason: HOSPADM

## 2023-12-17 RX ORDER — NITROGLYCERIN 0.4 MG/1
0.4 TABLET SUBLINGUAL
Status: DISCONTINUED | OUTPATIENT
Start: 2023-12-17 | End: 2023-12-18 | Stop reason: HOSPADM

## 2023-12-17 RX ORDER — SODIUM CHLORIDE 9 MG/ML
100 INJECTION, SOLUTION INTRAVENOUS CONTINUOUS
Status: DISPENSED | OUTPATIENT
Start: 2023-12-17 | End: 2023-12-18

## 2023-12-17 RX ADMIN — ASPIRIN 324 MG: 81 TABLET, CHEWABLE ORAL at 18:06

## 2023-12-17 RX ADMIN — SODIUM CHLORIDE 100 ML/HR: 0.9 INJECTION, SOLUTION INTRAVENOUS at 22:07

## 2023-12-17 RX ADMIN — NITROGLYCERIN 0.4 MG: 0.4 TABLET SUBLINGUAL at 18:21

## 2023-12-17 RX ADMIN — FAMOTIDINE 20 MG: 10 INJECTION, SOLUTION INTRAVENOUS at 18:43

## 2023-12-17 RX ADMIN — HEPARIN SODIUM 4000 UNITS: 1000 INJECTION INTRAVENOUS; SUBCUTANEOUS at 18:14

## 2023-12-17 RX ADMIN — METOPROLOL TARTRATE 25 MG: 25 TABLET, FILM COATED ORAL at 18:13

## 2023-12-17 NOTE — ED NOTES
Report received from previous shift. Pt is resting and has no needs at this time. Pt states he feels a little better after the Pepcid.     Mary Washington RN  12/17/23 [id number]

## 2023-12-17 NOTE — Clinical Note
Defib pad site: left lower flank.Defib pad site: RT UPPER CHEST.Defib pad site assessment: skin integrity intact.

## 2023-12-17 NOTE — Clinical Note
Left heart catheterization: A catheter was advanced over a guidewire into the ascending aorta.  After recording ascending aortic pressure, the catheter was advanced across the aortic valve and left ventricular pressure was recorded.   The catheter was   pulled back across the aortic valve and into the ascending aorta and pullback pressures were obtained.

## 2023-12-17 NOTE — ED PROVIDER NOTES
History  Chief Complaint   Patient presents with    Chest Pain     C/o cp and sob, since yesterday.      HPI  48-year-old male past medical history of hyperlipidemia presents with chest pain that started yesterday at about 3 PM.  He states it is in the center of his chest, nonradiating with associated mild shortness of breath.  Reports that it has been constant since yesterday.  Denies any leg pain or swelling, history of DVT or PE, recent long car trips or flights.  Prior to Admission Medications   Prescriptions Last Dose Informant Patient Reported? Taking?   ciclopirox (PENLAC) 8 % solution Not Taking  Yes No   Patient not taking: Reported on 12/17/2023   ketoconazole (NIZORAL) 2 % cream   Yes Yes   Sig: apply 1 gram topically once daily   methylPREDNISolone 4 MG tablet therapy pack Not Taking  No No   Sig: Use as directed on package   Patient not taking: Reported on 8/23/2022   rosuvastatin (CRESTOR) 10 MG tablet   Yes Yes   Sig: Take 10 mg by mouth daily      Facility-Administered Medications: None       History reviewed. No pertinent past medical history.    History reviewed. No pertinent surgical history.    Family History   Problem Relation Age of Onset    Diabetes Mother      I have reviewed and agree with the history as documented.    E-Cigarette/Vaping    E-Cigarette Use Never User      E-Cigarette/Vaping Substances    Nicotine No     THC No     CBD No     Flavoring No     Other No     Unknown No      Social History     Tobacco Use    Smoking status: Never    Smokeless tobacco: Never   Vaping Use    Vaping status: Never Used   Substance Use Topics    Alcohol use: No    Drug use: No       Review of Systems   Constitutional:  Negative for chills and fever.   HENT:  Negative for dental problem and ear pain.    Eyes:  Negative for pain and redness.   Respiratory:  Positive for shortness of breath. Negative for cough.    Cardiovascular:  Positive for chest pain. Negative for palpitations.   Gastrointestinal:   Negative for abdominal pain and nausea.   Endocrine: Negative for polydipsia and polyphagia.   Genitourinary:  Negative for dysuria and frequency.   Musculoskeletal:  Negative for arthralgias and joint swelling.   Skin:  Negative for color change and rash.   Neurological:  Negative for dizziness and headaches.   Psychiatric/Behavioral:  Negative for behavioral problems and confusion.    All other systems reviewed and are negative.      Physical Exam  Physical Exam  Vitals and nursing note reviewed.   Constitutional:       General: He is not in acute distress.     Appearance: He is well-developed. He is not diaphoretic.   HENT:      Head: Atraumatic.      Right Ear: External ear normal.      Left Ear: External ear normal.      Nose: Nose normal.   Eyes:      Conjunctiva/sclera: Conjunctivae normal.      Pupils: Pupils are equal, round, and reactive to light.   Neck:      Vascular: No JVD.   Cardiovascular:      Rate and Rhythm: Normal rate and regular rhythm.      Heart sounds: Normal heart sounds. No murmur heard.  Pulmonary:      Effort: Pulmonary effort is normal. No respiratory distress.      Breath sounds: Normal breath sounds. No wheezing.   Abdominal:      General: Bowel sounds are normal. There is no distension.      Palpations: Abdomen is soft.      Tenderness: There is no abdominal tenderness.   Musculoskeletal:         General: Normal range of motion.      Cervical back: Normal range of motion and neck supple.   Skin:     General: Skin is warm and dry.      Capillary Refill: Capillary refill takes less than 2 seconds.   Neurological:      Mental Status: He is alert and oriented to person, place, and time.      Cranial Nerves: No cranial nerve deficit.   Psychiatric:         Behavior: Behavior normal.         Vital Signs  ED Triage Vitals [12/17/23 1735]   Temperature Pulse Respirations Blood Pressure SpO2   97.7 °F (36.5 °C) 96 18 155/87 99 %      Temp Source Heart Rate Source Patient Position -  Orthostatic VS BP Location FiO2 (%)   Temporal Monitor Sitting Left arm --      Pain Score       --           Vitals:    12/17/23 1735 12/17/23 1813 12/17/23 1827 12/17/23 1845   BP: 155/87 126/71 132/76 127/73   Pulse: 96 100 (!) 108 102   Patient Position - Orthostatic VS: Sitting  Sitting Sitting         Visual Acuity      ED Medications  Medications   sodium chloride (PF) 0.9 % injection 3 mL (has no administration in time range)   nitroglycerin (NITROSTAT) SL tablet 0.4 mg (0.4 mg Sublingual Given 12/17/23 1821)   aspirin chewable tablet 324 mg (324 mg Oral Given 12/17/23 1806)   metoprolol tartrate (LOPRESSOR) tablet 25 mg (25 mg Oral Given 12/17/23 1813)   heparin (porcine) injection 4,000 Units (4,000 Units Intravenous Given 12/17/23 1814)   Famotidine (PF) (PEPCID) injection 20 mg (20 mg Intravenous Given 12/17/23 1843)       Diagnostic Studies  Results Reviewed       Procedure Component Value Units Date/Time    HS Troponin I 2hr [[id number]]     Lab Status: No result Specimen: Blood     HS Troponin 0hr (reflex protocol) [[id number]]  (Normal) Collected: 12/17/23 1803    Lab Status: Final result Specimen: Blood from Arm, Right Updated: 12/17/23 1840     hs TnI 0hr <2 ng/L     Basic metabolic panel [[id number]]  (Abnormal) Collected: 12/17/23 1803    Lab Status: Final result Specimen: Blood from Arm, Right Updated: 12/17/23 1834     Sodium 139 mmol/L      Potassium 3.8 mmol/L      Chloride 102 mmol/L      CO2 30 mmol/L      ANION GAP 7 mmol/L      BUN 13 mg/dL      Creatinine 0.89 mg/dL      Glucose 155 mg/dL      Calcium 9.5 mg/dL      eGFR 101 ml/min/1.73sq m     Narrative:      National Kid[employer] guidelines for Chronic Kidney Disease (CKD):     Stage 1 with normal or high GFR (GFR > 90 mL/min/1.73 square meters)    Stage 2 Mild CKD (GFR = 60-89 mL/min/1.73 square meters)    Stage 3A Moderate CKD (GFR = 45-59 mL/min/1.73 square meters)    Stage 3B Moderate CKD (GFR = 30-44 mL/min/1.73 square  meters)    Stage 4 Severe CKD (GFR = 15-29 mL/min/1.73 square meters)    Stage 5 End Stage CKD (GFR <15 mL/min/1.73 square meters)  Note: GFR calculation is accurate only with a steady state creatinine    Hepatic function panel [[id number]]     Lab Status: No result Specimen: Blood     CBC and differential [[id number]] Collected: 12/17/23 1803    Lab Status: Final result Specimen: Blood from Arm, Right Updated: 12/17/23 1809     WBC 8.48 Thousand/uL      RBC 5.11 Million/uL      Hemoglobin 14.6 g/dL      Hematocrit 44.3 %      MCV 87 fL      MCH 28.6 pg      MCHC 33.0 g/dL      RDW 12.3 %      MPV 9.6 fL      Platelets 281 Thousands/uL      nRBC 0 /100 WBCs      Neutrophils Relative 73 %      Immat GRANS % 0 %      Lymphocytes Relative 16 %      Monocytes Relative 8 %      Eosinophils Relative 3 %      Basophils Relative 0 %      Neutrophils Absolute 6.17 Thousands/µL      Immature Grans Absolute 0.03 Thousand/uL      Lymphocytes Absolute 1.35 Thousands/µL      Monocytes Absolute 0.65 Thousand/µL      Eosinophils Absolute 0.25 Thousand/µL      Basophils Absolute 0.03 Thousands/µL                    X-ray chest 1 view portable    (Results Pending)              Procedures  ECG 12 Lead Documentation Only    Date/Time: 12/17/2023 6:41 PM    Performed by: Nicky Teague MD  Authorized by: Nicky Teague MD    Comments:      Normal sinus rhythm rate of 98, right axis deviation, ST elevation in lead II, no reciprocal changes  Repeat EKG   CriticalCare Time    Date/Time: 12/17/2023 7:34 PM    Performed by: Nicky Teague MD  Authorized by: Nicky Teague MD    Critical care provider statement:     Critical care time (minutes):  43    Critical care was necessary to treat or prevent imminent or life-threatening deterioration of the following conditions: ACS requiring multiple EKG's, close monitoring, medication administration.           ED Course                               SBIRT 22yo+      Flowsheet Row Most Recent Value    Initial Alcohol Screen: US AUDIT-C     1. How often do you have a drink containing alcohol? 0 Filed at: 12/17/2023 1741   2. How many drinks containing alcohol do you have on a typical day you are drinking?  0 Filed at: 12/17/2023 1741   3a. Male UNDER 65: How often do you have five or more drinks on one occasion? 0 Filed at: 12/17/2023 1741   3b. FEMALE Any Age, or MALE 65+: How often do you have 4 or more drinks on one occassion? 0 Filed at: 12/17/2023 1741   Audit-C Score 0 Filed at: 12/17/2023 1741   VINOD: How many times in the past year have you...    Used an illegal drug or used a prescription medication for non-medical reasons? Never Filed at: 12/17/2023 1741                      Medical Decision Making  Amount and/or Complexity of Data Reviewed  Labs: ordered.  Radiology: ordered.    Risk  OTC drugs.  Prescription drug management.  Decision regarding hospitalization.           Patient presents with 24 hours of chest pain. ST elevation in lead II. Initial trop negative. Discussed with interventional cardiologist, Dr. Joseph, who will take to cath lab for potential late presentation MI as patient still with pain after treatment in ED.  Disposition  Final diagnoses:   MI (myocardial infarction) (HCC)     Time reflects when diagnosis was documented in both MDM as applicable and the Disposition within this note       Time User Action Codes Description Comment    12/17/2023  7:18 PM Nicky Teague Add [I21.9] MI (myocardial infarction) (HCC)           ED Disposition       ED Disposition   Send to Cath Lab    Condition   --    Date/Time   Sun Dec 17, 2023 1923    Comment   --             Follow-up Information    None         Patient's Medications   Discharge Prescriptions    No medications on file       No discharge procedures on file.    PDMP Review       None            ED Provider  Electronically Signed by             Nicky Teague MD  12/17/23 1934

## 2023-12-18 ENCOUNTER — APPOINTMENT (OUTPATIENT)
Dept: NON INVASIVE DIAGNOSTICS | Facility: HOSPITAL | Age: 48
End: 2023-12-18
Payer: COMMERCIAL

## 2023-12-18 VITALS
SYSTOLIC BLOOD PRESSURE: 100 MMHG | HEIGHT: 65 IN | HEART RATE: 78 BPM | WEIGHT: 299 LBS | RESPIRATION RATE: 20 BRPM | DIASTOLIC BLOOD PRESSURE: 58 MMHG | OXYGEN SATURATION: 95 % | BODY MASS INDEX: 49.81 KG/M2 | TEMPERATURE: 98.3 F

## 2023-12-18 PROBLEM — I21.3 STEMI (ST ELEVATION MYOCARDIAL INFARCTION) (HCC): Status: ACTIVE | Noted: 2023-12-18

## 2023-12-18 LAB
2HR DELTA HS TROPONIN: 64 NG/L
4HR DELTA HS TROPONIN: 101 NG/L
AORTIC ROOT: 3.6 CM
APICAL FOUR CHAMBER EJECTION FRACTION: 64 %
ASCENDING AORTA: 3.4 CM
ATRIAL RATE: 100 BPM
ATRIAL RATE: 104 BPM
ATRIAL RATE: 104 BPM
ATRIAL RATE: 107 BPM
ATRIAL RATE: 73 BPM
ATRIAL RATE: 95 BPM
ATRIAL RATE: 97 BPM
ATRIAL RATE: 98 BPM
ATRIAL RATE: 98 BPM
CARDIAC TROPONIN I PNL SERPL HS: 111 NG/L
CARDIAC TROPONIN I PNL SERPL HS: 175 NG/L
CARDIAC TROPONIN I PNL SERPL HS: 212 NG/L
CATH EF QUANTITATIVE: 60 %
CHOLEST SERPL-MCNC: 153 MG/DL
E WAVE DECELERATION TIME: 175 MS
E/A RATIO: 1.48
FLUAV RNA RESP QL NAA+PROBE: NEGATIVE
FLUBV RNA RESP QL NAA+PROBE: NEGATIVE
FRACTIONAL SHORTENING: 30 (ref 28–44)
HDLC SERPL-MCNC: 40 MG/DL
INTERVENTRICULAR SEPTUM IN DIASTOLE (PARASTERNAL SHORT AXIS VIEW): 1.2 CM
INTERVENTRICULAR SEPTUM: 1.2 CM (ref 0.6–1.1)
LAAS-AP2: 17.2 CM2
LAAS-AP4: 13.4 CM2
LDLC SERPL CALC-MCNC: 96 MG/DL (ref 0–100)
LEFT ATRIUM AREA SYSTOLE SINGLE PLANE A4C: 13.7 CM2
LEFT ATRIUM SIZE: 4 CM
LEFT ATRIUM VOLUME (MOD BIPLANE): 42 ML
LEFT ATRIUM VOLUME INDEX (MOD BIPLANE): 17.9 ML/M2
LEFT INTERNAL DIMENSION IN SYSTOLE: 3.1 CM (ref 2.1–4)
LEFT VENTRICULAR INTERNAL DIMENSION IN DIASTOLE: 4.4 CM (ref 3.5–6)
LEFT VENTRICULAR POSTERIOR WALL IN END DIASTOLE: 1.2 CM
LEFT VENTRICULAR STROKE VOLUME: 48 ML
LVSV (TEICH): 48 ML
MV E'TISSUE VEL-SEP: 8 CM/S
MV PEAK A VEL: 0.65 M/S
MV PEAK E VEL: 96 CM/S
MV STENOSIS PRESSURE HALF TIME: 51 MS
MV VALVE AREA P 1/2 METHOD: 4.31
P AXIS: 43 DEGREES
P AXIS: 44 DEGREES
P AXIS: 45 DEGREES
P AXIS: 45 DEGREES
P AXIS: 46 DEGREES
P AXIS: 47 DEGREES
P AXIS: 51 DEGREES
P AXIS: 52 DEGREES
P AXIS: 53 DEGREES
PR INTERVAL: 166 MS
PR INTERVAL: 168 MS
PR INTERVAL: 168 MS
PR INTERVAL: 170 MS
PR INTERVAL: 172 MS
PR INTERVAL: 182 MS
QRS AXIS: 100 DEGREES
QRS AXIS: 106 DEGREES
QRS AXIS: 108 DEGREES
QRS AXIS: 41 DEGREES
QRS AXIS: 42 DEGREES
QRS AXIS: 82 DEGREES
QRS AXIS: 94 DEGREES
QRS AXIS: 95 DEGREES
QRS AXIS: 97 DEGREES
QRSD INTERVAL: 100 MS
QRSD INTERVAL: 84 MS
QRSD INTERVAL: 88 MS
QRSD INTERVAL: 92 MS
QRSD INTERVAL: 96 MS
QRSD INTERVAL: 96 MS
QRSD INTERVAL: 98 MS
QT INTERVAL: 330 MS
QT INTERVAL: 338 MS
QT INTERVAL: 342 MS
QT INTERVAL: 346 MS
QT INTERVAL: 346 MS
QT INTERVAL: 348 MS
QT INTERVAL: 380 MS
QTC INTERVAL: 418 MS
QTC INTERVAL: 424 MS
QTC INTERVAL: 440 MS
QTC INTERVAL: 441 MS
QTC INTERVAL: 441 MS
QTC INTERVAL: 444 MS
QTC INTERVAL: 448 MS
QTC INTERVAL: 449 MS
QTC INTERVAL: 454 MS
RIGHT ATRIUM AREA SYSTOLE A4C: 12.7 CM2
RIGHT VENTRICLE ID DIMENSION: 3 CM
RSV RNA RESP QL NAA+PROBE: NEGATIVE
SARS-COV-2 RNA RESP QL NAA+PROBE: NEGATIVE
SL CV LEFT ATRIUM LENGTH A2C: 5.7 CM
SL CV LV EF: 65
SL CV PED ECHO LEFT VENTRICLE DIASTOLIC VOLUME (MOD BIPLANE) 2D: 88 ML
SL CV PED ECHO LEFT VENTRICLE SYSTOLIC VOLUME (MOD BIPLANE) 2D: 39 ML
T WAVE AXIS: -1 DEGREES
T WAVE AXIS: -2 DEGREES
T WAVE AXIS: 0 DEGREES
T WAVE AXIS: 1 DEGREES
T WAVE AXIS: 11 DEGREES
T WAVE AXIS: 2 DEGREES
T WAVE AXIS: 3 DEGREES
T WAVE AXIS: 4 DEGREES
T WAVE AXIS: 5 DEGREES
TRICUSPID ANNULAR PLANE SYSTOLIC EXCURSION: 3.2 CM
TRIGL SERPL-MCNC: 85 MG/DL
VENTRICULAR RATE: 100 BPM
VENTRICULAR RATE: 104 BPM
VENTRICULAR RATE: 104 BPM
VENTRICULAR RATE: 107 BPM
VENTRICULAR RATE: 73 BPM
VENTRICULAR RATE: 95 BPM
VENTRICULAR RATE: 97 BPM
VENTRICULAR RATE: 98 BPM
VENTRICULAR RATE: 98 BPM

## 2023-12-18 PROCEDURE — 0241U HB NFCT DS VIR RESP RNA 4 TRGT: CPT | Performed by: PHYSICIAN ASSISTANT

## 2023-12-18 PROCEDURE — 99238 HOSP IP/OBS DSCHRG MGMT 30/<: CPT | Performed by: PHYSICIAN ASSISTANT

## 2023-12-18 PROCEDURE — 84484 ASSAY OF TROPONIN QUANT: CPT | Performed by: INTERNAL MEDICINE

## 2023-12-18 PROCEDURE — 80061 LIPID PANEL: CPT | Performed by: INTERNAL MEDICINE

## 2023-12-18 PROCEDURE — 93306 TTE W/DOPPLER COMPLETE: CPT | Performed by: INTERNAL MEDICINE

## 2023-12-18 PROCEDURE — 93306 TTE W/DOPPLER COMPLETE: CPT

## 2023-12-18 PROCEDURE — 93005 ELECTROCARDIOGRAM TRACING: CPT

## 2023-12-18 RX ORDER — COLCHICINE 0.6 MG/1
0.6 TABLET ORAL 2 TIMES DAILY
Qty: 60 TABLET | Refills: 2 | Status: SHIPPED | OUTPATIENT
Start: 2023-12-18

## 2023-12-18 RX ORDER — IBUPROFEN 600 MG/1
600 TABLET ORAL EVERY 6 HOURS SCHEDULED
Status: DISCONTINUED | OUTPATIENT
Start: 2023-12-18 | End: 2023-12-18 | Stop reason: HOSPADM

## 2023-12-18 RX ORDER — IBUPROFEN 600 MG/1
600 TABLET ORAL EVERY 6 HOURS SCHEDULED
Qty: 42 TABLET | Refills: 0 | Status: SHIPPED | OUTPATIENT
Start: 2023-12-18

## 2023-12-18 RX ORDER — COLCHICINE 0.6 MG/1
1.2 TABLET ORAL 2 TIMES DAILY
Status: DISCONTINUED | OUTPATIENT
Start: 2023-12-18 | End: 2023-12-18 | Stop reason: HOSPADM

## 2023-12-18 RX ADMIN — IBUPROFEN 600 MG: 600 TABLET ORAL at 14:27

## 2023-12-18 NOTE — ED NOTES
Pt transported to the cath lab by cath lab team. Pts wife has been taken to the cath lab waiting room.     Mary Washington RN  12/17/23 2004

## 2023-12-18 NOTE — H&P
History and Physical - Cardiology   Jordan Price 48 y.o. male MRN: [id number]  Unit/Bed#: ED 28 Encounter: [id number]  12/17/23  7:27 PM          Assessment:  Possible late presentation STEMI.  Hypercholesterolemia  Obesity    Plan:  The patient continues to have 3 out of 10 chest pain going on for greater than 24 hours.  This is a late presentation MI versus pericarditis.    Given the EKG changes with possible STEMI we will proceed with emergent cardiac catheterization with an eye towards primary angioplasty of the infarct artery.  The patient and wife understand that this is a late presentation MI however given persistent symptoms benefits may outweigh the risks.  It is unusual that chest pain has been going on for greater than 24 hours and troponin is negative.  I have discussed in detail with patient and his wife regarding the indications, alternatives, risks and benefit of cardiac catheterization and possible PCI. The procedure risks, benefits, and complications (including but not limited to bleeding, infection, arrhythmia, nephrotoxicity, vessel injury, myocardial infarction, CVA, and death) were reviewed.  Patient is alert and oriented x3 and wishes to proceed. All questions answered.      Principal Problem: Chest pain    HPI: Jordan Price is a 48 y.o. year old male with a history of obesity and hypercholesterolemia who yesterday around 2 PM developed a moderate midsternal chest pain at rest which has persisted throughout the day yesterday, throughout the night and persisted today as well.  The patient was doing heavy work and also ate a hoagie which he attributed his symptoms to.  Given persistent symptoms he presented to the emergency room.      Review of Systems:    Review of Systems negative except for HPI    Historical Information   Hypercholesterolemia  History reviewed. No pertinent surgical history.  Social History     Substance and Sexual Activity   Alcohol Use No     Social History  "    Substance and Sexual Activity   Drug Use No     Social History     Tobacco Use   Smoking Status Never   Smokeless Tobacco Never       Family History:   Family History   Problem Relation Age of Onset    Diabetes Mother        Meds/Allergies   all current active meds have been reviewed  Allergies   Allergen Reactions    Peanut (Diagnostic) - Food Allergy Anaphylaxis       Objective   Vitals: Blood pressure 127/73, pulse 102, temperature 97.7 °F (36.5 °C), temperature source Temporal, resp. rate 18, height 5' 5\" (1.651 m), weight 136 kg (300 lb), SpO2 97%., Body mass index is 49.92 kg/m².,   Orthostatic Blood Pressures      Flowsheet Row Most Recent Value   Blood Pressure 127/73 filed at 2023 1845   Patient Position - Orthostatic VS Sitting filed at 2023 1845            Systolic (24hrs), Av , Min:126 , Max:155     Diastolic (24hrs), Av, Min:71, Max:87      No intake or output data in the 24 hours ending 23    Invasive Devices       Peripheral Intravenous Line  Duration             Peripheral IV 23 Distal;Right;Upper;Ventral (anterior) Arm <1 day                        Physical Exam:  Physical Exam  GEN: Alert and oriented x 3, in no acute distress.  Well appearing and well nourished.   HEENT: Sclera anicteric, conjunctivae pink, mucous membranes moist. Oropharynx clear.   NECK: Supple, no carotid bruits, no significant JVD. Trachea midline, no thyromegaly.   HEART: Regular rhythm, normal S1 and S2, no murmurs, clicks, gallops or rubs. PMI nondisplaced, no thrills.   LUNGS: Clear to auscultation bilaterally; no wheezes, rales, or rhonchi. No increased work of breathing or signs of respiratory distress.   ABDOMEN: Soft, nontender, nondistended, normoactive bowel sounds.   EXTREMITIES: Skin warm and well perfused, no clubbing, cyanosis, or edema.  NEURO: No focal findings. Normal speech. Mood and affect normal.   SKIN: Normal without suspicious lesions on exposed skin.    Lab " Results:   Admission on 12/17/2023   Component Date Value    Ventricular Rate 12/17/2023 98     Atrial Rate 12/17/2023 98     WA Interval 12/17/2023 168     QRSD Interval 12/17/2023 96     QT Interval 12/17/2023 346     QTC Interval 12/17/2023 441     P Axis 12/17/2023 45     QRS [place] 12/17/2023 95     T Wave Axis 12/17/2023 1     Ventricular Rate 12/17/2023 97     Atrial Rate 12/17/2023 97     WA Interval 12/17/2023 170     QRSD Interval 12/17/2023 98     QT Interval 12/17/2023 348     QTC Interval 12/17/2023 441     P Axis 12/17/2023 51     QRS Axis 12/17/2023 94     T Wave Axis 12/17/2023 3     Ventricular Rate 12/17/2023 98     Atrial Rate 12/17/2023 98     WA Interval 12/17/2023 166     QRSD Interval 12/17/2023 100     QT Interval 12/17/2023 348     QTC Interval 12/17/2023 444     P Axis 12/17/2023 53     QRS [place] 12/17/2023 97     T Wave [place] 12/17/2023 -2     WBC 12/17/2023 8.48     RBC 12/17/2023 5.11     Hemoglobin 12/17/2023 14.6     Hematocrit 12/17/2023 44.3     MCV 12/17/2023 87     MCH 12/17/2023 28.6     MCHC 12/17/2023 33.0     RDW 12/17/2023 12.3     MPV 12/17/2023 9.6     Platelets 12/17/2023 281     nRBC 12/17/2023 0     Neutrophils Relative 12/17/2023 73     Immat GRANS % 12/17/2023 0     Lymphocytes Relative 12/17/2023 16     Monocytes Relative 12/17/2023 8     Eosinophils Relative 12/17/2023 3     Basophils Relative 12/17/2023 0     Neutrophils Absolute 12/17/2023 6.17     Immature Grans Absolute 12/17/2023 0.03     Lymphocytes Absolute 12/17/2023 1.35     Monocytes Absolute 12/17/2023 0.65     Eosinophils Absolute 12/17/2023 0.25     Basophils Absolute 12/17/2023 0.03     Sodium 12/17/2023 139     Potassium 12/17/2023 3.8     Chloride 12/17/2023 102     CO2 12/17/2023 30     ANION GAP 12/17/2023 7     BUN 12/17/2023 13     Creatinine 12/17/2023 0.89     Glucose 12/17/2023 155 (H)     Calcium 12/17/2023 9.5     eGFR 12/17/2023 101     hs TnI 0hr 12/17/2023 <2     Ventricular Rate 12/17/2023 100      Atrial Rate 12/17/2023 100     MN Interval 12/17/2023 168     QRSD Interval 12/17/2023 96     QT Interval 12/17/2023 348     QTC Interval 12/17/2023 448     P Axis 12/17/2023 52     QRS [place] 12/17/2023 100     T Wave Axis 12/17/2023 0     Ventricular Rate 12/17/2023 104     Atrial Rate 12/17/2023 104     MN Interval 12/17/2023 166     QRSD Interval 12/17/2023 92     QT Interval 12/17/2023 342     QTC Interval 12/17/2023 449     P Axis 12/17/2023 45     QRS [place] 12/17/2023 108     T Wave [place] 12/17/2023 -1     Ventricular Rate 12/17/2023 104     Atrial Rate 12/17/2023 104     MN Interval 12/17/2023 166     QRSD Interval 12/17/2023 92     QT Interval 12/17/2023 346     QTC Interval 12/17/2023 454     P Axis 12/17/2023 44     QRS [place] 12/17/2023 106     T Wave Axis 12/17/2023 2              CBC with diff:   Results from last 7 days   Lab Units 12/17/23  1803   WBC Thousand/uL 8.48   HEMOGLOBIN g/dL 14.6   HEMATOCRIT % 44.3   MCV fL 87   PLATELETS Thousands/uL 281   RBC Million/uL 5.11   MCH pg 28.6   MCHC g/dL 33.0   RDW % 12.3   MPV fL 9.6   NRBC AUTO /100 WBCs 0       Cardiac testing:   None        EKG: Normal sinus rhythm with MN depression and ST elevation in leads I, II and aVL.

## 2023-12-18 NOTE — DISCHARGE INSTR - AVS FIRST PAGE
1. Please see the post cardiac catheterization dishcarge instructions.   No heavy lifting, greater than 10 lbs. or strenuous  activity for 48 hrs.    2.Remove band aid tomorrow.  Shower and wash area- wrist gently with soap and water- beginning tomorrow. Rinse and pat dry.  Apply new water seal band aid.  Repeat this process for 5 days. No powders, creams lotions or antibiotic ointments  for 5 days.  No tub baths, hot tubs or swimming for 5 days.     3. Please call our office ([phone]) if you have any fever, redness, swelling, discharge from your wrist access site.    4.No driving for 2 days

## 2023-12-18 NOTE — PLAN OF CARE
Problem: Potential for Falls  Goal: Patient will remain free of falls  Description: INTERVENTIONS:  - Educate patient/family on patient safety including physical limitations  - Instruct patient to call for assistance with activity   - Consult OT/PT to assist with strengthening/mobility   - Keep Call bell within reach  - Keep bed low and locked with side rails adjusted as appropriate  - Keep care items and personal belongings within reach  - Initiate and maintain comfort rounds  - Make Fall Risk Sign visible to staff  - Apply yellow socks and bracelet for high fall risk patients  - Consider moving patient to room near nurses station  12/18/2023 0045 by Luz Dobbs, RN  Outcome: Progressing  12/18/2023 0045 by Luz Dobbs, RN  Outcome: Progressing

## 2023-12-18 NOTE — DISCHARGE SUMMARY
Discharge Summary  Jordan Price 48 y.o. male MRN: [id number]  Unit/Bed#: -01 Encounter: [id number]    Admission Date: 12/17/2023   Discharge Date: 12/18/2023    Disposition: Home    Discharge Diagnosis: Pericarditis  Secondary Diagnoses: HPL    Condition at Discharge: good   Procedures: cardiac cath -mild nonobstructive cad  Discharge weight:   Vitals:    12/17/23 1735 12/18/23 0600   Weight: 136 kg (300 lb) 136 kg (299 lb 13.2 oz)       REVIEW OF SYSTEMS:  Constitutional:  Denies fever or chills   Eyes:  Denies change in visual acuity   HENT:  Denies nasal congestion or sore throat   Respiratory:  Denies cough or shortness of breath   Cardiovascular: +chest pain   GI:  Denies abdominal pain, nausea, vomiting, bloody stools or diarrhea   :  Denies dysuria, frequency, difficulty in micturition and nocturia  Musculoskeletal:  Denies back pain or joint pain   Neurologic:  Denies headache, focal weakness or sensory changes   Endocrine:  Denies polyuria or polydipsia   Lymphatic:  Denies swollen glands   Psychiatric:  Denies depression or anxiety         HPI and Hospital Course:  Patient presented to [facility] emergency room around 2 PM with moderate midsternal chest pain at rest that persisted throughout the day the day prior throughout that night and into the next morning.  Patient states he was doing heavy work when the pain came on and was actually eating hoagie also which she contributed to the symptoms.  Patient found to likely have a late presenting MI versus pericarditis.  EKG changes consistent with STEMI, patient was taken emergently to cardiac cath which revealed mild nonobstructive CAD.  Patient presumed to have pericarditis and started on treatment with colchicine and Motrin.  Patient states today he is feeling better but still has mild pain.  Patient states he actually did not take the colchicine yet. Pt will tested for covid/rsv/flu given his pericarditis. Pt will also get echo,  "given these tests are normal he is ready for discharge.       Discharge Medications:  See after visit summary for reconciled discharge medications provided to patient and family.      Current Facility-Administered Medications   Medication Dose Route Frequency    colchicine (COLCRYS) tablet 1.2 mg  1.2 mg Oral BID    [Transfer Hold] nitroglycerin (NITROSTAT) SL tablet 0.4 mg  0.4 mg Sublingual Q5 Min PRN    oxyCODONE-acetaminophen (PERCOCET) 5-325 mg per tablet 1 tablet  1 tablet Oral Q4H PRN    [Transfer Hold] sodium chloride (PF) 0.9 % injection 3 mL  3 mL Intravenous Q1H PRN       Pertinent Labs/diagnostics:        CBC with diff:   Results from last 7 days   Lab Units 12/17/23  1803   WBC Thousand/uL 8.48   HEMOGLOBIN g/dL 14.6   HEMATOCRIT % 44.3   MCV fL 87   PLATELETS Thousands/uL 281   RBC Million/uL 5.11   MCH pg 28.6   MCHC g/dL 33.0   RDW % 12.3   MPV fL 9.6   NRBC AUTO /100 WBCs 0       CMP:  Results from last 7 days   Lab Units 12/17/23  1803   POTASSIUM mmol/L 3.8   CHLORIDE mmol/L 102   CO2 mmol/L 30   BUN mg/dL 13   CREATININE mg/dL 0.89   CALCIUM mg/dL 9.5   AST U/L 17   ALT U/L 23   ALK PHOS U/L 69   EGFR ml/min/1.73sq m 101       Lipid Profile:   No results found for: \"CHOL\"  Lab Results   Component Value Date    HDL 40 12/18/2023    HDL 33 (L) 09/26/2022     Lab Results   Component Value Date    LDLCALC 96 12/18/2023    LDLCALC 181 (H) 09/26/2022     Lab Results   Component Value Date    TRIG 85 12/18/2023    TRIG 141 09/26/2022         Cardiac testing:   No results found for this or any previous visit.    No results found for this or any previous visit.    No valid procedures specified.  No results found for this or any previous visit.      PHYSICAL EXAMS:  General:  Patient is not in acute distress, laying in the bed comfortably, awake, alert responding to commands.  Head: Normocephalic, Atraumatic.  HEENT:  Both pupils normal-size atraumatic, normocephalic, nonicteric  Neck:  JVP not raised. " Trachea central  Respiratory:  Bronchovascular breathing all over the chest without any accompaniment  Cardiovascular:  S1-S2 normal RRR without any murmur rails or rub  GI:  Abdomen soft nontender. Liver and spleen normal size, no free fluid, hernial sites unremarkable without any cough impulse  Musculoskeletal:  No edema  Neurologic:  Patient is awake alert, responding to command, well-oriented to time and place and person moving all extremities ambulating well    Discharge instructions/Information to patient and family:   See after visit summary for information provided to patient and family.      Provisions for Follow-Up Care:  See after visit summary for information related to follow-up care and any pertinent home health orders.      Planned Readmission: No    Discharge Statement   I spent 20 minutes minutes discharging the patient. This time was spent on the day of discharge. I had direct contact with the patient on the day of discharge. Additional documentation is required if more than 30 minutes were spent on discharge.     Nahed Osorio PA-C  12/18/2023,,1:46 PM

## 2023-12-18 NOTE — CASE MANAGEMENT
Case Management Discharge Planning Note    Patient name Jordan Price  Location /-01 MRN [id number]  : 1975 Date 2023       Current Admission Date: 2023  Current Admission Diagnosis:STEMI (ST elevation myocardial infarction) (HCC)   Patient Active Problem List    Diagnosis Date Noted    STEMI (ST elevation myocardial infarction) (HCC) 2023    Epidermoid cyst of skin of back 2023      LOS (days): 0  Geometric Mean LOS (GMLOS) (days):   Days to GMLOS:     OBJECTIVE:            Current admission status: Observation   [facility] Pharm[facility]:   [id number] [address] [address]  Phone: [phone] Fax: [phone]    Primary Care Provider: Aayush Cortez DO    Primary Insurance: [employer]  Secondary Insurance:     DISCHARGE DETAILS:     CM reviewed Obs notice with patient and wife at bedside. Both stated they understood. Patient asked wife to sign Obs notice for him.

## 2024-06-05 ENCOUNTER — CONSULT (OUTPATIENT)
Dept: PAIN MEDICINE | Facility: CLINIC | Age: 49
End: 2024-06-05
Payer: COMMERCIAL

## 2024-06-05 VITALS
WEIGHT: 299 LBS | HEIGHT: 65 IN | SYSTOLIC BLOOD PRESSURE: 146 MMHG | DIASTOLIC BLOOD PRESSURE: 85 MMHG | BODY MASS INDEX: 49.81 KG/M2 | HEART RATE: 80 BPM

## 2024-06-05 DIAGNOSIS — M51.16 LUMBAR DISC DISEASE WITH RADICULOPATHY: Primary | ICD-10-CM

## 2024-06-05 PROCEDURE — 99204 OFFICE O/P NEW MOD 45 MIN: CPT | Performed by: PHYSICAL MEDICINE & REHABILITATION

## 2024-06-05 RX ORDER — GABAPENTIN 300 MG/1
300 CAPSULE ORAL 3 TIMES DAILY
Qty: 90 CAPSULE | Refills: 1 | Status: SHIPPED | OUTPATIENT
Start: 2024-06-05

## 2024-06-05 NOTE — PROGRESS NOTES
Assessment:  1. Lumbar disc disease with radiculopathy        Plan:    Mr. Price is a pleasant 48-year-old male significant past medical history of STEMI presents to [facility] for initial evaluation regarding low back pain with rating symptoms into the left lower extremity.  He has been seeing chiropractor for years but reports going to a different chiropractor once while his therapist was on vacation and while he did have an adjustment on his hips and his low back he reported worsening radiculopathy into the left lower extremity that has remained persistent ever since.  He has now completed greater than 6 weeks of home exercises, chiropractic therapy in the last 6 months primarily with Dr. Ligia Sanchez of [facility] and [facility] but still has persistent pain.  At this time we will plan for obtaining MRI lumbar spine without contrast to better identify disc and spine pathology contributing to symptoms into the left leg.  If diagnostic imaging matches clinical presentation would consider a left-sided L5-S1 and S1 TFESI but for now we will await MRI results.  Will also start the patient on MRI brain stabilizing agents gabapentin 300 mg and titrate up to 3 times a day as tolerated and necessary.  All questions answered, patient is agreeable with plan.    History of Present Illness:    Jordan Price is a 48 y.o. male who presents to [facility] for initial evaluation of the above stated pain complaints. The patient has a past medical and chronic pain history as outlined in the assessment section.  Patient presents to [facility] for initial evaluation regarding 1.5 months duration of mid to low back pain with rating symptoms into the left lower extremity.  Denies any significant inciting event or recent trauma.  Today reports moderate to severe pain rated 8 out of 10 and interfere with activities.  Pain is nearly constant 60 to 95% of  the time that is present throughout the day and night.  Describes symptoms of cramping, shooting, sharp, throbbing, dull aching, pins-and-needles pain.  Also reports lower extremity weakness but denies falls.  Does not use any durable medical equip fibrillation.  Symptoms are worse with coughing, sneezing.  Reports no significant improvements with rest.  Denies smoking, marijuana or alcohol use.  Not currently use anything for pain.  Presents today for initial evaluation.    Review of Systems:    Review of Systems   Constitutional:  Negative for fever and unexpected weight change.   HENT:  Negative for trouble swallowing.    Eyes:  Negative for visual disturbance.   Respiratory:  Negative for shortness of breath and wheezing.    Cardiovascular:  Negative for chest pain and palpitations.   Gastrointestinal:  Negative for constipation, diarrhea, nausea and vomiting.   Endocrine: Negative for cold intolerance, heat intolerance and polydipsia.   Genitourinary:  Negative for difficulty urinating and frequency.   Musculoskeletal:  Negative for arthralgias, gait problem, joint swelling and myalgias.        Joint pain  Muscle pain   Skin:  Negative for rash.   Neurological:  Negative for dizziness, seizures, syncope, weakness and headaches.   Hematological:  Does not bruise/bleed easily.   Psychiatric/Behavioral:  Negative for dysphoric mood.            History reviewed. No pertinent past medical history.    Past Surgical History:   Procedure Laterality Date    CARDIAC CATHETERIZATION N/A 12/17/2023    Procedure: Cardiac catheterization;  Surgeon: Georgi Joseph MD;  Location: [facility];  Service: Cardiology    CARDIAC CATHETERIZATION Left 12/17/2023    Procedure: Cardiac Left Heart Cath;  Surgeon: Georgi Joseph MD;  Location: [facility];  Service: Cardiology    CARDIAC CATHETERIZATION Left 12/17/2023    Procedure: Cardiac Left Ventriculogram;  Surgeon: Georgi Joseph MD;  Location: [facility]  "CARDIAC CATH LAB;  Service: Cardiology       Family History   Problem Relation Age of Onset    Diabetes Mother     Alzheimer's disease Maternal Grandmother        Social History     Occupational History    Not on file   Tobacco Use    Smoking status: Never    Smokeless tobacco: Never   Vaping Use    Vaping status: Never Used   Substance and Sexual Activity    Alcohol use: No    Drug use: No    Sexual activity: Yes     Partners: Female     Birth control/protection: None         Current Outpatient Medications:     gabapentin (NEURONTIN) 300 mg capsule, Take 1 capsule (300 mg total) by mouth 3 (three) times a day, Disp: 90 capsule, Rfl: 1    rosuvastatin (CRESTOR) 10 MG tablet, Take 10 mg by mouth daily, Disp: , Rfl:     colchicine (COLCRYS) 0.6 mg tablet, Take 1 tablet (0.6 mg total) by mouth 2 (two) times a day Hold for diarrhea, Disp: 60 tablet, Rfl: 2    ibuprofen (MOTRIN) 600 mg tablet, Take 1 tablet (600 mg total) by mouth every 6 (six) hours, Disp: 42 tablet, Rfl: 0    ketoconazole (NIZORAL) 2 % cream, apply 1 gram topically once daily, Disp: , Rfl:     Allergies   Allergen Reactions    Peanut (Diagnostic) - Food Allergy Anaphylaxis       Physical Exam:    /85   Pulse 80   Ht 5' 5\" (1.651 m)   Wt 136 kg (299 lb)   BMI 49.76 kg/m²     Constitutional: normal, well developed, well nourished, alert, in no distress and non-toxic and no overt pain behavior. and obese  Eyes: anicteric  HEENT: grossly intact  Neck: supple, symmetric, trachea midline and no masses   Pulmonary:even and unlabored  Cardiovascular:No edema or pitting edema present  Skin:Normal without rashes or lesions and well hydrated  Psychiatric:Mood and affect appropriate  Neurologic:Cranial Nerves II-XII grossly intact  Musculoskeletal:antalgic, tenderness to palpation left-sided lumbar paraspinals, decreased active and passive range of motion lumbar flexion and extension limited by pain, MMT 5 out of 5 bilateral lower extremities, sensation " decreased to light touch in patchy distribution posterior lateral left thigh, positive straight leg raise in the supine position radicular pain into the left leg    Imaging  MRI lumbar spine wo contrast    (Results Pending)       Orders Placed This Encounter   Procedures    MRI lumbar spine wo contrast

## 2024-06-19 ENCOUNTER — HOSPITAL ENCOUNTER (OUTPATIENT)
Dept: MRI IMAGING | Facility: HOSPITAL | Age: 49
Discharge: HOME | End: 2024-06-19
Attending: PHYSICAL MEDICINE & REHABILITATION
Payer: COMMERCIAL

## 2024-06-19 DIAGNOSIS — M51.16 LUMBAR DISC DISEASE WITH RADICULOPATHY: ICD-10-CM

## 2024-06-19 PROCEDURE — 72148 MRI LUMBAR SPINE W/O DYE: CPT

## 2024-06-27 ENCOUNTER — TELEPHONE (OUTPATIENT)
Dept: RADIOLOGY | Facility: CLINIC | Age: 49
End: 2024-06-27

## 2024-06-27 DIAGNOSIS — M48.062 SPINAL STENOSIS OF LUMBAR REGION WITH NEUROGENIC CLAUDICATION: ICD-10-CM

## 2024-06-27 DIAGNOSIS — M51.26 LUMBAR DISC HERNIATION: Primary | ICD-10-CM

## 2024-06-27 NOTE — TELEPHONE ENCOUNTER
S/w staff member in Radiology Dept on 6/27/24 to request MRI results. Please check for final results.

## 2024-06-27 NOTE — TELEPHONE ENCOUNTER
----- Message from Angel Scott DO sent at 6/27/2024  1:12 PM EDT -----  Please notify patient MRI lumbar spine demonstrating large disc herniation and moderate stenosis  Referral to Ortho surg Dr. Valero ordered  If no surgery advised, would consider TFESI as discussed in office but for now surgical eval first  Thank you  ----- Message -----  From: Interface, Radiology Results In  Sent: 6/27/2024   9:04 AM EDT  To: Angel Scott DO

## 2024-06-27 NOTE — TELEPHONE ENCOUNTER
S/w pt, advised of the same. Verbalized understanding, requested RN also contact pt's wife and relay the same.   S/w pt's wife Socorro at # [phone], advised of the same. Provided referral information  Israel Valero MD  Phone: [phone]

## 2024-07-15 ENCOUNTER — OFFICE VISIT (OUTPATIENT)
Dept: OBGYN CLINIC | Facility: HOSPITAL | Age: 49
End: 2024-07-15
Attending: PHYSICAL MEDICINE & REHABILITATION
Payer: COMMERCIAL

## 2024-07-15 ENCOUNTER — HOSPITAL ENCOUNTER (OUTPATIENT)
Dept: RADIOLOGY | Facility: HOSPITAL | Age: 49
Discharge: HOME | End: 2024-07-15
Attending: ORTHOPAEDIC SURGERY
Payer: COMMERCIAL

## 2024-07-15 VITALS
WEIGHT: 299.83 LBS | BODY MASS INDEX: 49.95 KG/M2 | HEIGHT: 65 IN | HEART RATE: 86 BPM | SYSTOLIC BLOOD PRESSURE: 128 MMHG | DIASTOLIC BLOOD PRESSURE: 93 MMHG

## 2024-07-15 DIAGNOSIS — M51.26 LUMBAR DISC HERNIATION: ICD-10-CM

## 2024-07-15 DIAGNOSIS — R52 PAIN: Primary | ICD-10-CM

## 2024-07-15 DIAGNOSIS — M48.062 SPINAL STENOSIS OF LUMBAR REGION WITH NEUROGENIC CLAUDICATION: ICD-10-CM

## 2024-07-15 DIAGNOSIS — R52 PAIN: ICD-10-CM

## 2024-07-15 PROCEDURE — 72110 X-RAY EXAM L-2 SPINE 4/>VWS: CPT

## 2024-07-15 PROCEDURE — 99214 OFFICE O/P EST MOD 30 MIN: CPT | Performed by: ORTHOPAEDIC SURGERY

## 2024-07-15 RX ORDER — MELOXICAM 15 MG/1
15 TABLET ORAL DAILY
Qty: 28 TABLET | Refills: 0 | Status: SHIPPED | OUTPATIENT
Start: 2024-07-15

## 2024-07-15 NOTE — PROGRESS NOTES
Assessment & Plan/Medical Decision Makin y.o. male with Back Pain and Left Radicular Leg Pain and imaging findings most notable for lumbar spondylosis , L5-S1 disc herniation           The clinical, physical and imaging findings were reviewed with the patient.  Jordan  has a constellation of findings consistent with Lumbar Radiculopathy in the setting of lumbar degenerative disease, left sided L5-S1 disc herniation.      Fortunately patient remains neurologically intact and functional.  He has been able to continue working and has noted significant improvement in his pain since starting gabapentin.  We discussed the treatment options including physical therapy, at home exercises, activity modifications, chiropractic medicine, oral medications, interventional spine procedures.  At this time recommend continued conservative treatments.    Meloxicam rx sent to patient's pharmacy. Discussed reasoning for prescribing medication, proper usage, and potential side effects.  Referral placed for physical therapy to work on core strengthening, lumbar ROM, strengthening, and stretching exercises.   Patient will also follow-up with Dr. Scott for consideration of lumbar interventional spine procedure.    Patient instructed to return to office/ER sooner if symptoms are not improving, getting worse, or new worrisome/neurologic symptoms arise.  Patient will follow up in approx. 6-12 weeks for re-evaluation after further conservative treatments.       Subjective:      Chief Complaint: Back Pain    HPI:  Jordan Price is a 48 y.o. male presenting for initial visit with chief complaint of back pain and left leg pain.  Patient notes that he had chronic back pain issues for several years which have responded to chiropractic adjustments.  He notes increased onset of back pain with radicular left leg pain approximately 2 months ago.  This was atraumatic.  He denies any subjective weakness.    Denies any lashell trauma. Denies fever  or chills, no night sweats. Denies any bladder or bowel changes.      Conservative therapy includes the following:   Medications: Gabapentin (takes the edge off)    Injections: Not yet     Physical Therapy: Not yet  Chiropractic Medicine: yes, but not for this issue   Accupunture/Massage Therapy: has not attempted   These therapeutic modalities were ineffective at providing sustained pain relief/functional improvement.     Nicotine dependent: No  Occupation: Plumbing/drain cleaning work   Living situation: Lives alone  ADLs: patient is able to perform     Objective:     Family History   Problem Relation Age of Onset    Diabetes Mother     Alzheimer's disease Maternal Grandmother        History reviewed. No pertinent past medical history.    Current Outpatient Medications   Medication Sig Dispense Refill    gabapentin (NEURONTIN) 300 mg capsule Take 1 capsule (300 mg total) by mouth 3 (three) times a day 90 capsule 1    rosuvastatin (CRESTOR) 10 MG tablet Take 10 mg by mouth daily      colchicine (COLCRYS) 0.6 mg tablet Take 1 tablet (0.6 mg total) by mouth 2 (two) times a day Hold for diarrhea 60 tablet 2    ibuprofen (MOTRIN) 600 mg tablet Take 1 tablet (600 mg total) by mouth every 6 (six) hours 42 tablet 0    ketoconazole (NIZORAL) 2 % cream apply 1 gram topically once daily       No current facility-administered medications for this visit.       Past Surgical History:   Procedure Laterality Date    CARDIAC CATHETERIZATION N/A 12/17/2023    Procedure: Cardiac catheterization;  Surgeon: Georgi Joseph MD;  Location: [facility];  Service: Cardiology    CARDIAC CATHETERIZATION Left 12/17/2023    Procedure: Cardiac Left Heart Cath;  Surgeon: Georgi Joseph MD;  Location: [facility];  Service: Cardiology    CARDIAC CATHETERIZATION Left 12/17/2023    Procedure: Cardiac Left Ventriculogram;  Surgeon: Georgi Joseph MD;  Location: [facility];  Service: Cardiology       Social  "History     Socioeconomic History    Marital status: /Civil Union     Spouse name: Not on file    Number of children: Not on file    Years of education: Not on file    Highest education level: Not on file   Occupational History    Not on file   Tobacco Use    Smoking status: Never    Smokeless tobacco: Never   Vaping Use    Vaping status: Never Used   Substance and Sexual Activity    Alcohol use: No    Drug use: No    Sexual activity: Yes     Partners: Female     Birth control/protection: None   Other Topics Concern    Not on file   Social History Narrative    Not on file     Social Determinants of Health     Financial Resource Strain: Not on file   Food Insecurity: Not on file   Transportation Needs: Not on file   Physical Activity: Not on file   Stress: Not on file   Social Connections: Not on file   Intimate Partner Violence: Not on file   Housing Stability: Not on file       Allergies   Allergen Reactions    Peanut (Diagnostic) - Food Allergy Anaphylaxis       Review of Systems  General- denies fever/chills  HEENT- denies hearing loss or sore throat  Eyes- denies eye pain or visual disturbances, denies red eyes  Respiratory- denies cough or SOB  Cardio- denies chest pain or palpitations  GI- denies abdominal pain  Endocrine- denies urinary frequency  Urinary- denies pain with urination  Musculoskeletal- Negative except noted above  Skin- denies rashes or wounds  Neurological- denies dizziness or headache  Psychiatric- denies anxiety or difficulty concentrating    Physical Exam  /93   Pulse 86   Ht 5' 5\" (1.651 m)   Wt 136 kg (299 lb 13.2 oz)   BMI 49.89 kg/m²     General/Constitutional: No apparent distress: well-nourished and well developed.  Lymphatic: No appreciable lymphadenopathy  Respiratory: Non-labored breathing  Vascular: No edema, swelling or tenderness, except as noted in detailed exam.  Integumentary: No impressive skin lesions present, except as noted in detailed exam.  Psych: " "Normal mood and affect, oriented to person, place and time.  MSK: normal other than stated in HPI and exam  Gait & balance: no evidence of myelopathic gait, ambulates Independently     Lumbar spine range of motion:  -Forward flexion to 90  -Extension to neutral  -Lateral bend 25 right, 25 left  -Rotation 25 right, 25 left  There tenderness with palpation along lumbar paraspinal musculature, no midline tenderness     Neurologic:    Lower Extremity Motor Function    Right  Left    Iliopsoas  5/5  5/5    Quadriceps 5/5 5/5   Tibialis anterior  5/5  5/5    EHL  5/5  5/5    Gastroc. muscle  5/5  5/5    Heel rise  5/5  5/5    Toe rise  5/5  5/5      Sensory: light touch is intact to bilateral upper and lower extremities     Reflexes:    Right Left   Patellar 1+ 1+   Achilles 1+ 1+   Babinski neg neg     Other tests:  Straight Leg Raise: positive  Jessica SI: negative  LUIS SI: negative  Greater troch: no tenderness   Internal/external hip ROM: intact, no pain   Flexion/extension knee ROM: intact, no pain   Vascular: WWP extremities, 2+DP bilateral      Diagnostic Tests   IMAGING: I have personally reviewed the images and these are my findings:  Lumbar Spine X-rays from 7/15/24: multi level lumbar spondylosis with loss of disc height, osteophyte formation and facet hypertrophy, no apparent spondylolisthesis, no appreciated lytic/blastic lesions, no obvious instability    Lumbar Spine MRI from 6/19/24: multi level lumbar disc degeneration with disc desiccation, loss of disc height, L5-S1 left-sided paracentral disc herniation with nerve displacement    Electronic Medical Records were reviewed including MRI report, Dr. Scott notes    Procedures, if performed today     None performed       Portions of the record may have been created with voice recognition software.  Occasional wrong word or \"sound a like\" substitutions may have occurred due to the inherent limitations of voice recognition software.  Read the chart carefully " and recognize, using context, where substitutions have occurred.

## 2024-07-17 ENCOUNTER — TELEPHONE (OUTPATIENT)
Dept: PAIN MEDICINE | Facility: CLINIC | Age: 49
End: 2024-07-17

## 2024-07-17 NOTE — TELEPHONE ENCOUNTER
----- Message from Angel Scott DO sent at 7/16/2024  2:31 PM EDT -----  Reviewed Dr. Valero's notes and imaging  No surgery advised and has been referred back for injections  If patient is interested and amenable with we will now proceed with left-sided L5-S1 and S1 TFESI under fluoroscopy guidance  Please schedule if interested  Thank you  ----- Message -----  From: Israle Valero MD  Sent: 7/16/2024  11:58 AM EDT  To: Angel Scott, DO    This guys massive disc must have started to resorb because he only mild symptoms... He can't take off work for surgery but with mild symptoms did not recommend it.  He said he would be up for an injection.  Thanks for referral.     -Israel

## 2024-07-17 NOTE — TELEPHONE ENCOUNTER
----- Message from Angel Scott DO sent at 7/16/2024  2:31 PM EDT -----  Reviewed Dr. Valero's notes and imaging  No surgery advised and has been referred back for injections  If patient is interested and amenable with we will now proceed with left-sided L5-S1 and S1 TFESI under fluoroscopy guidance  Please schedule if interested  Thank you  ----- Message -----  From: Israel Valero MD  Sent: 7/16/2024  11:58 AM EDT  To: Angel Scott, DO    This guys massive disc must have started to resorb because he only mild symptoms... He can't take off work for surgery but with mild symptoms did not recommend it.  He said he would be up for an injection.  Thanks for referral.     -Israel

## 2024-07-19 NOTE — TELEPHONE ENCOUNTER
Nurse attempted to reach pt, call would not go through.    Nurse reviewed chart and pt is scheduled for previous mentioned injection.

## 2024-08-08 DIAGNOSIS — M51.26 LUMBAR DISC HERNIATION: ICD-10-CM

## 2024-08-08 RX ORDER — MELOXICAM 15 MG/1
15 TABLET ORAL DAILY
Qty: 14 TABLET | Refills: 0 | Status: SHIPPED | OUTPATIENT
Start: 2024-08-08

## 2024-08-08 NOTE — TELEPHONE ENCOUNTER
Patient has upcoming injection scheduled for 8/19. Will send an additional 2 week prescription of meloxicam

## 2024-08-19 ENCOUNTER — TELEPHONE (OUTPATIENT)
Dept: RADIOLOGY | Facility: CLINIC | Age: 49
End: 2024-08-19

## 2024-08-19 ENCOUNTER — HOSPITAL ENCOUNTER (OUTPATIENT)
Dept: RADIOLOGY | Facility: CLINIC | Age: 49
Discharge: HOME | End: 2024-08-19
Payer: COMMERCIAL

## 2024-08-19 VITALS
SYSTOLIC BLOOD PRESSURE: 133 MMHG | RESPIRATION RATE: 18 BRPM | TEMPERATURE: 97.6 F | HEART RATE: 86 BPM | OXYGEN SATURATION: 95 % | DIASTOLIC BLOOD PRESSURE: 89 MMHG

## 2024-08-19 DIAGNOSIS — M51.26 LUMBAR DISC HERNIATION: ICD-10-CM

## 2024-08-19 DIAGNOSIS — M51.16 INTERVERTEBRAL DISC DISORDER WITH RADICULOPATHY OF LUMBAR REGION: ICD-10-CM

## 2024-08-19 PROCEDURE — 64483 NJX AA&/STRD TFRM EPI L/S 1: CPT | Performed by: PHYSICAL MEDICINE & REHABILITATION

## 2024-08-19 PROCEDURE — 64484 NJX AA&/STRD TFRM EPI L/S EA: CPT | Performed by: PHYSICAL MEDICINE & REHABILITATION

## 2024-08-19 RX ORDER — MELOXICAM 15 MG/1
15 TABLET ORAL DAILY
Qty: 30 TABLET | Refills: 0 | Status: SHIPPED | OUTPATIENT
Start: 2024-08-19

## 2024-08-19 RX ORDER — LIDOCAINE HYDROCHLORIDE 10 MG/ML
4 INJECTION, SOLUTION EPIDURAL; INFILTRATION; INTRACAUDAL; PERINEURAL ONCE
Status: COMPLETED | OUTPATIENT
Start: 2024-08-19 | End: 2024-08-19

## 2024-08-19 RX ORDER — METHYLPREDNISOLONE ACETATE 40 MG/ML
40 INJECTION, SUSPENSION INTRA-ARTICULAR; INTRALESIONAL; INTRAMUSCULAR; PARENTERAL; SOFT TISSUE ONCE
Status: COMPLETED | OUTPATIENT
Start: 2024-08-19 | End: 2024-08-19

## 2024-08-19 RX ORDER — BUPIVACAINE HCL/PF 2.5 MG/ML
2 VIAL (ML) INJECTION ONCE
Status: COMPLETED | OUTPATIENT
Start: 2024-08-19 | End: 2024-08-19

## 2024-08-19 RX ADMIN — BUPIVACAINE HYDROCHLORIDE 2 ML: 2.5 INJECTION, SOLUTION EPIDURAL; INFILTRATION; INTRACAUDAL at 16:24

## 2024-08-19 RX ADMIN — METHYLPREDNISOLONE ACETATE 40 MG: 40 INJECTION, SUSPENSION INTRA-ARTICULAR; INTRALESIONAL; INTRAMUSCULAR; PARENTERAL; SOFT TISSUE at 16:24

## 2024-08-19 RX ADMIN — LIDOCAINE HYDROCHLORIDE 4 ML: 10 INJECTION, SOLUTION EPIDURAL; INFILTRATION; INTRACAUDAL; PERINEURAL at 16:21

## 2024-08-19 RX ADMIN — IOHEXOL 2 ML: 300 INJECTION, SOLUTION INTRAVENOUS at 16:23

## 2024-08-19 NOTE — DISCHARGE INSTR - LAB
Epidural Steroid Injection   WHAT YOU NEED TO KNOW:   An epidural steroid injection (JEFFREY) is a procedure to inject steroid medicine into the epidural space. The epidural space is between your spinal cord and vertebrae. Steroids reduce inflammation and fluid buildup in your spine that may be causing pain. You may be given pain medicine along with the steroids.          ACTIVITY  Do not drive or operate machinery today.  No strenuous activity today - bending, lifting, etc.  You may resume normal activites starting tomorrow - start slowly and as tolerated.  You may shower today, but no tub baths or hot tubs.  You may have numbness for several hours from the local anesthetic. Please use caution and common sense, especially with weight-bearing activities.    CARE OF THE INJECTION SITE  If you have soreness or pain, apply ice to the area today (20 minutes on/20 minutes off).  Starting tomorrow, you may use warm, moist heat or ice if needed.  You may have an increase or change in your discomfort for 36-48 hours after your treatment.  Apply ice and continue with any pain medication you have been prescribed.  Notify the [facility] if you have any of the following: redness, drainage, swelling, headache, stiff neck or fever above 100°F.    SPECIAL INSTRUCTIONS  Our office will contact you in approximately 14 days for a progress report.    MEDICATIONS  Continue to take all routine medications.  Our office may have instructed you to hold some medications.    As no general anesthesia was used in today's procedure, you should not experience any side effects related to anesthesia.     If you are diabetic, the steroids used in today's injection may temporarily increase your blood sugar levels after the first few days after your injection. Please keep a close eye on your sugars and alert the doctor who manages your diabetes if your sugars are significantly high from your baseline or you are symptomatic.     If you have a  problem specifically related to your procedure, please call our office at [phone].  Problems not related to your procedure should be directed to your primary care physician.

## 2024-08-25 DIAGNOSIS — M51.16 LUMBAR DISC DISEASE WITH RADICULOPATHY: ICD-10-CM

## 2024-08-26 RX ORDER — GABAPENTIN 300 MG/1
300 CAPSULE ORAL 3 TIMES DAILY
Qty: 90 CAPSULE | Refills: 1 | Status: SHIPPED | OUTPATIENT
Start: 2024-08-26

## 2024-09-03 ENCOUNTER — TELEPHONE (OUTPATIENT)
Dept: RADIOLOGY | Facility: MEDICAL CENTER | Age: 49
End: 2024-09-03

## 2024-09-13 DIAGNOSIS — M51.26 LUMBAR DISC HERNIATION: ICD-10-CM

## 2024-09-13 RX ORDER — MELOXICAM 15 MG/1
15 TABLET ORAL DAILY
Qty: 30 TABLET | Refills: 0 | Status: SHIPPED | OUTPATIENT
Start: 2024-09-13

## 2024-10-22 ENCOUNTER — HOSPITAL ENCOUNTER (OUTPATIENT)
Dept: RADIOLOGY | Facility: HOSPITAL | Age: 49
Discharge: HOME | End: 2024-10-22
Payer: COMMERCIAL

## 2024-10-22 ENCOUNTER — OFFICE VISIT (OUTPATIENT)
Dept: PAIN MEDICINE | Facility: CLINIC | Age: 49
End: 2024-10-22
Payer: COMMERCIAL

## 2024-10-22 VITALS
HEIGHT: 69 IN | SYSTOLIC BLOOD PRESSURE: 158 MMHG | RESPIRATION RATE: 18 BRPM | BODY MASS INDEX: 46.65 KG/M2 | HEART RATE: 88 BPM | DIASTOLIC BLOOD PRESSURE: 90 MMHG | WEIGHT: 315 LBS

## 2024-10-22 DIAGNOSIS — M70.62 TROCHANTERIC BURSITIS OF LEFT HIP: ICD-10-CM

## 2024-10-22 DIAGNOSIS — M70.62 TROCHANTERIC BURSITIS OF LEFT HIP: Primary | ICD-10-CM

## 2024-10-22 DIAGNOSIS — M25.552 LEFT HIP PAIN: ICD-10-CM

## 2024-10-22 DIAGNOSIS — M51.16 INTERVERTEBRAL DISC DISORDER WITH RADICULOPATHY OF LUMBAR REGION: ICD-10-CM

## 2024-10-22 PROCEDURE — 73501 X-RAY EXAM HIP UNI 1 VIEW: CPT

## 2024-10-22 PROCEDURE — 99214 OFFICE O/P EST MOD 30 MIN: CPT | Performed by: PHYSICAL MEDICINE & REHABILITATION

## 2024-10-22 NOTE — PROGRESS NOTES
Assessment:  1. Trochanteric bursitis of left hip    2. Left hip pain    3. Intervertebral disc disorder with radiculopathy of lumbar region        Plan:  Mr. Price is a pleasant 48-year-old male significant past medical history of STEMI and large disc extrusion L5-S1 presents to [facility] for follow-up and reevaluation regarding isolated left hip pain.  He reports the radicular component of his left lower extremity pain has significant improved from the left-sided L5-S1 and S1 TFESI but does report recently lifting and twisting a heavy bag of trash and experiencing exquisite left-sided hip pain.  During today's evaluation he is demonstrating isolated left-sided greater trochanteric bursitis which is greatly impacted his sleep and quality of life.  At this time we will plan for left-sided greater ultrasound-guided greater trochanteric bursa steroid injection as well as order updated left hip x-ray.  All questions answered, patient is agreeable with plan.    Complete risks and benefits including bleeding, infection, tissue reaction, nerve injury and allergic reaction were discussed. The approach was demonstrated using models and literature was provided. Verbal and written consent was obtained.    My impressions and treatment recommendations were discussed in detail with the patient who verbalized understanding and had no further questions.  Discharge instructions were provided. I personally saw and examined the patient and I agree with the above discussed plan of care.    Orders Placed This Encounter   Procedures    US guidance     Left GTB USGI     Standing Status:   Future     Standing Expiration Date:   10/22/2028     Scheduling Instructions:      No prep required.        Please bring your insurance cards and a form of photo ID.  Bring your order/script for the test if from a non - [facility] provider.        Arrive 15 minutes prior to your appointment time to register.            To  schedule this appointment, please contact Central Scheduling at [phone].           Order Specific Question:   What is the patient's sedation requirement?     Answer:   No Sedation    XR hip/pelv 1 vw left if performed     Standing Status:   Future     Standing Expiration Date:   10/22/2028     Scheduling Instructions:      Bring along any outside films relating to this procedure.           No orders of the defined types were placed in this encounter.      History of Present Illness:  Jordan Price is a 48 y.o. male who presents for a follow up office visit in regards to Back Pain and Leg Pain (LLE).   The patient’s current symptoms include isolated left anterior thigh pain currently rated 8 out of 10 and described as a constant sharp, throbbing, shooting pain.  Presents today for follow-up and reevaluation.  Previously performed left-sided L5-S1 and S1 TFESI August 19, 2024 which patient reports approximately 2 months of significant greater than 50% relief in his pain and a gradual return of symptoms.  Presents today for follow-up and reevaluation.    I have personally reviewed and/or updated the patient's past medical history, past surgical history, family history, social history, current medications, allergies, and vital signs today.     Review of Systems   Respiratory:  Negative for shortness of breath.    Cardiovascular:  Negative for chest pain.   Gastrointestinal:  Negative for constipation, diarrhea, nausea and vomiting.   Musculoskeletal:  Positive for back pain, gait problem and joint swelling. Negative for arthralgias and myalgias.        LLE Pain   Skin:  Negative for rash.   Neurological:  Negative for dizziness, seizures and weakness.   All other systems reviewed and are negative.      Patient Active Problem List   Diagnosis    Epidermoid cyst of skin of back    STEMI (ST elevation myocardial infarction) (HCC)    Intervertebral disc disorder with radiculopathy of lumbar region       History  "reviewed. No pertinent past medical history.    Past Surgical History:   Procedure Laterality Date    CARDIAC CATHETERIZATION N/A 12/17/2023    Procedure: Cardiac catheterization;  Surgeon: Georgi Joseph MD;  Location: [facility];  Service: Cardiology    CARDIAC CATHETERIZATION Left 12/17/2023    Procedure: Cardiac Left Heart Cath;  Surgeon: Georgi Joseph MD;  Location: [facility];  Service: Cardiology    CARDIAC CATHETERIZATION Left 12/17/2023    Procedure: Cardiac Left Ventriculogram;  Surgeon: Georgi Joseph MD;  Location: [facility];  Service: Cardiology       Family History   Problem Relation Age of Onset    Diabetes Mother     Alzheimer's disease Maternal Grandmother        Social History     Occupational History    Not on file   Tobacco Use    Smoking status: Never    Smokeless tobacco: Never   Vaping Use    Vaping status: Never Used   Substance and Sexual Activity    Alcohol use: No    Drug use: No    Sexual activity: Yes     Partners: Female     Birth control/protection: None       Current Outpatient Medications on File Prior to Visit   Medication Sig    gabapentin (NEURONTIN) 300 mg capsule take 1 capsule by mouth three times a day    meloxicam (MOBIC) 15 mg tablet take 1 tablet by mouth once daily    rosuvastatin (CRESTOR) 10 MG tablet Take 10 mg by mouth daily    colchicine (COLCRYS) 0.6 mg tablet Take 1 tablet (0.6 mg total) by mouth 2 (two) times a day Hold for diarrhea    ketoconazole (NIZORAL) 2 % cream apply 1 gram topically once daily     No current facility-administered medications on file prior to visit.       Allergies   Allergen Reactions    Peanut (Diagnostic) - Food Allergy Anaphylaxis       Physical Exam:    /90   Pulse 88   Resp 18   Ht 5' 9\" (1.753 m)   Wt (!) 147 kg (324 lb)   BMI 47.85 kg/m²     Constitutional:normal, well developed, well nourished, alert, in no distress and non-toxic and no overt pain " behavior.  Eyes:anicteric  HEENT:grossly intact  Neck:supple, symmetric, trachea midline and no masses   Pulmonary:even and unlabored  Cardiovascular:No edema or pitting edema present  Skin:Normal without rashes or lesions and well hydrated  Psychiatric:Mood and affect appropriate  Neurologic:Cranial Nerves II-XII grossly intact  Musculoskeletal:antalgic, tenderness to palpation left greater trochanter    Imaging

## 2024-10-29 ENCOUNTER — TELEPHONE (OUTPATIENT)
Dept: PAIN MEDICINE | Facility: MEDICAL CENTER | Age: 49
End: 2024-10-29

## 2024-10-29 NOTE — TELEPHONE ENCOUNTER
----- Message from Angel Scott DO sent at 10/28/2024  8:13 PM EDT -----  Please notify patient xr hips demonstrate no acute osseous abnormalities  Please proceed with just GTB USGI as previously ordered  Thank you  ----- Message -----  From: Interface, Radiology Results In  Sent: 10/25/2024   9:06 PM EDT  To: Angel Scott DO

## 2024-10-29 NOTE — TELEPHONE ENCOUNTER
FYI-    S/w pt's wife Socorro as per medical release of information document. Per wife pt is no longer having LT hip pain for  a few days. Requested to cx LT Bursa injection and will call back to schedule lumbar epidural when returns from 3 week vacation.    Lt hip Bursa inj cx per pt/wife request

## 2025-07-31 ENCOUNTER — HOSPITAL ENCOUNTER (OUTPATIENT)
Dept: RADIOLOGY | Facility: CLINIC | Age: 50
Discharge: HOME | End: 2025-07-31
Attending: PHYSICAL MEDICINE & REHABILITATION
Payer: COMMERCIAL

## 2025-07-31 VITALS
DIASTOLIC BLOOD PRESSURE: 81 MMHG | HEART RATE: 76 BPM | TEMPERATURE: 98 F | OXYGEN SATURATION: 93 % | RESPIRATION RATE: 18 BRPM | SYSTOLIC BLOOD PRESSURE: 129 MMHG

## 2025-07-31 DIAGNOSIS — M51.16 INTERVERTEBRAL DISC DISORDER WITH RADICULOPATHY OF LUMBAR REGION: ICD-10-CM

## 2025-07-31 PROCEDURE — 64484 NJX AA&/STRD TFRM EPI L/S EA: CPT | Performed by: PHYSICAL MEDICINE & REHABILITATION

## 2025-07-31 PROCEDURE — 64483 NJX AA&/STRD TFRM EPI L/S 1: CPT | Performed by: PHYSICAL MEDICINE & REHABILITATION

## 2025-07-31 RX ORDER — METHYLPREDNISOLONE ACETATE 40 MG/ML
40 INJECTION, SUSPENSION INTRA-ARTICULAR; INTRALESIONAL; INTRAMUSCULAR; PARENTERAL; SOFT TISSUE ONCE
Status: COMPLETED | OUTPATIENT
Start: 2025-07-31 | End: 2025-07-31

## 2025-07-31 RX ORDER — BUPIVACAINE HCL/PF 2.5 MG/ML
2 VIAL (ML) INJECTION ONCE
Status: COMPLETED | OUTPATIENT
Start: 2025-07-31 | End: 2025-07-31

## 2025-07-31 RX ADMIN — METHYLPREDNISOLONE ACETATE 40 MG: 40 INJECTION, SUSPENSION INTRA-ARTICULAR; INTRALESIONAL; INTRAMUSCULAR; SOFT TISSUE at 14:04

## 2025-07-31 RX ADMIN — BUPIVACAINE HYDROCHLORIDE 2 ML: 2.5 INJECTION, SOLUTION EPIDURAL; INFILTRATION; INTRACAUDAL at 14:04

## 2025-07-31 RX ADMIN — IOHEXOL 2 ML: 300 INJECTION, SOLUTION INTRAVENOUS at 14:04

## 2025-08-14 ENCOUNTER — TELEPHONE (OUTPATIENT)
Dept: PAIN MEDICINE | Facility: CLINIC | Age: 50
End: 2025-08-14

## (undated) DEVICE — DGW .035 FC J3MM 260CM TEF: Brand: EMERALD

## (undated) DEVICE — CATH DIAG 5FR IMPULSE 110CM 6S PIG 145 ANG

## (undated) DEVICE — CATH GUIDE LAUNCHER 6FR AL 1.0

## (undated) DEVICE — CATH GUIDE RUNWAY 6FR AL1

## (undated) DEVICE — GLIDESHEATH SLENDER STAINLESS STEEL KIT: Brand: GLIDESHEATH SLENDER

## (undated) DEVICE — CATH GUIDE LAUNCHER 6FR JL 3.5

## (undated) DEVICE — CATH DIAG 5FR IMPULSE 100CM FL3.5

## (undated) DEVICE — CATH DIAG 5FR IMPULSE 100CM FR4